# Patient Record
Sex: FEMALE | Race: WHITE | NOT HISPANIC OR LATINO | Employment: FULL TIME | URBAN - METROPOLITAN AREA
[De-identification: names, ages, dates, MRNs, and addresses within clinical notes are randomized per-mention and may not be internally consistent; named-entity substitution may affect disease eponyms.]

---

## 2018-02-06 ENCOUNTER — HOSPITAL ENCOUNTER (OUTPATIENT)
Dept: RADIOLOGY | Facility: HOSPITAL | Age: 33
Discharge: HOME | End: 2018-02-06
Attending: INTERNAL MEDICINE
Payer: MEDICAID

## 2018-02-06 DIAGNOSIS — M25.559 HIP PAIN: Primary | ICD-10-CM

## 2018-02-06 DIAGNOSIS — M25.559 HIP PAIN: ICD-10-CM

## 2018-02-06 PROCEDURE — 73502 X-RAY EXAM HIP UNI 2-3 VIEWS: CPT | Mod: TC,FY,PO,RT

## 2018-02-16 DIAGNOSIS — M25.551 RIGHT HIP PAIN: Primary | ICD-10-CM

## 2018-03-07 ENCOUNTER — CLINICAL SUPPORT (OUTPATIENT)
Dept: REHABILITATION | Facility: HOSPITAL | Age: 33
End: 2018-03-07
Payer: MEDICAID

## 2018-03-07 DIAGNOSIS — M53.3 SI (SACROILIAC) PAIN: ICD-10-CM

## 2018-03-07 DIAGNOSIS — R29.898 WEAKNESS OF RIGHT LOWER EXTREMITY: ICD-10-CM

## 2018-03-07 PROCEDURE — 97161 PT EVAL LOW COMPLEX 20 MIN: CPT | Mod: PO

## 2018-03-07 NOTE — PLAN OF CARE
TIME RECORD    Date: 3/7/2018    Start Time:  11:00  Stop Time:  12:00    PROCEDURES:    TIMED  Procedure Min.   TE 5NC                     UNTIMED  Procedure Min.   PT eval 55         Total Timed Minutes:  0  Total Timed Units:  0  Total Untimed Units:  1  Charges Billed/# of units:  1    OUTPATIENT PHYSICAL THERAPY   PATIENT EVALUATION  Onset Date: Jan 2018  Primary Diagnosis: weakness, SI pain, decreased functional mobility  Treatment Diagnosis:   1. Weakness of right lower extremity     2. SI (sacroiliac) pain       No past medical history on file.  Precautions: Standard  Prior Therapy: none  Medications: Shelby AMIN currently has no medications in their medication list.  Nutrition:  Normal  History of Present Illness: Insidious onset of pain in Jan 2018  Prior Level of Function: Independent  Social History: Lives with family, 3 children (14, 13 , 4)  Place of Residence (Steps/Adaptations): single story home, four flights of stairs at work  Functional Deficits Leading to Referral/Nature of Injury: Difficulty performing her usual work outs, transfers from low surfaces, vehicle transfers, prolonged walking, prolonged sitting, ascending/descending steps, holding her 4 year old, dressing  Patient Therapy Goals: get rid of the pain, regain range of motion, and be able to be active again    Subjective     Shelby AMIN states her pain started insidiously in Jan 2018. Normally she does a high intensity interval workout of boxing training with occasional running but has not been able to since her pain started. She is also having difficulty picking up and holding her 4 year old child, lifting her R leg to put on her socks and shoes, transfers from low surfaces, performing vehicle transfers into her SUV, walking for long periods of time, ascending/descending the stairs at work, and sitting for long periods of time. 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     Pain:  Location: R hip and posterior thigh   Description: Sharp and soreness  Activities Which Increase Pain: Sitting, Walking, Lifting and Getting out of bed/chair  Activities Which Decrease Pain: pain medication and rest  Pain Scale: 0/10 at best 7/10 now  9/10 at worst    Objective     Posture: sacral sitting, decreased WB on R LE in standing, L LE in ER in standing, R LE shorter than L LE in supine, R ASIS lower than L ASIS in supine  Palpation: TTP R ASIS, R piriformis, R SI joint  Sensation: light touch intact  DTRs:intact  Range of Motion/Strength:  pain with PROM R add    Hip Right  Left  Pain/Dysfunction with Movement    AROM MMT AROM MMT    Flexion 120 5 120 5 Pain R post hip w/ ROM   Extension NT 5 NT 5    Abduction 50 3 50 5    Adduction NT  NT     Internal rotation 40 4 40 3 Pain post R hip with MMT   External rotation 45 3 45 4 Pain post hip R w/ ROM      Knee Right  Left  Pain/Dysfunction with Movement    AROM MMT AROM MMT    Flexion WFL 4/5 WFL 5/5 Pain R post hip   Extension WFL 5/5 WFL 5/5      Ankle Right  Left  Pain/Dysfunction with Movement    AROM MMT AROM MMT    Plantarflexion WFL 5/5 WFL 5/5    Dorsiflexion WFL 5/5 WFL 5/5          Flexibility: SLR 75* L, R 65* pain in R gluteal region  Gait: Without AD  Analysis: Assistance none, mild antalgic gait, L LE in ER compared to R LE  Bed Mobility:Independent  Transfers: Independent  Special Tests: SY positive R, Gaenslen positive R, pelvic compression positive R, R hip scour is negative, long axis distraction on R LE no change in symptoms  Other: Lumbar PIVM WNL    Functional Limitation Reports: G codes  Tool: FOTO Hip SURVEY  Category: Mobility ()  Limitation: 40%  Current: CK = at least 40% but < 60% impaired, limited or restricted  Goal: CI = at least 1% but < 20% impaired, limited or restricted    TREATMENT     Time In:  11:55  Time Out: 12:00    PT Evaluation Completed? Yes  Discussed Plan of Care with patient: Yes    Shelby received 5 minutes of therapeutic exercise & instruction including:  Push/pull in sitting and supine  Shelby received 0 minutes of manual therapy including:      Written Home Exercises Provided: Handouts provided for push/pull muscle energy technique in supine  Shelby demo good understanding of the education provided. Patient demo good return demo of skill of exercises.      Assessment       Initial Assessment (Pertinent finding, problem list and factors affecting outcome): Mrs. Anderson is a 33 year old female, who presents to the clinic with complaints of acute R posterior hip pain. She displays R hip AROM WNL but with complaints at end range ER and flexion, which limits her ability to dress, ascend/descend stairs, and perform transfers without an increase in symptoms. Her R LE weakness also limits her ability to perform transfers from low surfaces, vehicle transfers, her usual recreational activities, and care for her 4 year old without an increase in symptoms. In standing she has decreased WB on R LE and her L LE is held in slight ER. In supine her R LE is shorter than her L LE, her R ASIS is lower than her L ASIS. She has tenderness to palpation over her R SI joint, R piriformis, R gluteals, and R QL. She tested positive for SI involvement with SY, Gaenslen, and pelvic compression on R side. Her current score on FOTO Hip places her in the 40%<60% impaired, limited, or restricted category. She would benefit from physical therapy to improve her strength, flexibility, and ROM in order to return to her PLOF.     History  Co-morbidities and personal factors that may impact the plan of care Examination  Body Structures and Functions, activity limitations and participation restrictions that may impact the plan of care    Clinical Presentation   Co-morbidities:   none        Personal Factors:   no deficits Body  Regions:   lower extremities    Body Systems:    ROM  strength  flexibility            Participation Restrictions:   Difficulty with transfers from low surfaces, vehicle transfers, ascending/descending steps, holding her 4 yr old child, performing preferred work outs, prolonged walking and prolonged sitting     Activity limitations:   Learning and applying knowledge  no deficits    General Tasks and Commands  no deficits    Communication  no deficits    Mobility  lifting and carrying objects  walking  driving (bike, car, motorcycle)    Self care  dressing    Domestic Life  assisting others    Interactions/Relationships  no deficits    Life Areas  no deficits    Community and Social Life  recreation and leisure         evolving clinical presentation with changing clinical characteristics                      low   low  low Decision Making/ Complexity Score:  FOTO 40%       Rehab Potiential: good  Barriers to Rehab: None  Short Term Goals (4 Weeks):   1. This patient will be independent with a basic HEP.  2. This patient will have R hip AROM pain free and WNL in order to dress with no increase in symptoms.  3. This patient will increase R LE strength by 1/2 grade in order to hold her 4 year old with no increase in symptoms.   4. This patient will have a pain rating of 5/10 at worst with ADLs.  5. Patient will be able to achieve greater than or equal to 75 on the FOTO Hip placing patient in 20%<40% impaired, limited, or restricted category demonstrating overall improved functional ability with lower extremity.   Long Term Goals (8 Weeks):   1. This patient will be independent with an updated HEP.  2. This patient will increase R LE strength to 5/5 in order to ascend/descend 4 flights of stairs with no increase in symptoms.   4. This patient will have a pain rating of 1/10 at worst with ADLs.  5. Patient will be able to achieve greater than or equal to 85 on the FOTO Hip placing patient in 1%<20% impaired, limited, or  restricted category demonstrating overall improved functional ability with lower extremity.     Plan     Certification Period: 03/07/18 to 05/07/18  Recommended Treatment Plan: 2 times per week for 8 weeks: Group Therapy, Manual Therapy, Moist Heat/ Ice, Neuromuscular Re-ed, Patient Education and Therapeutic Exercise  Other Recommendations: modalities prn, IASTM prn, kinesiotape prn, Functional Dry Needling prn       Therapist: Kitty Guy, PT    I CERTIFY THE NEED FOR THESE SERVICES FURNISHED UNDER THIS PLAN OF TREATMENT AND WHILE UNDER MY CARE    Physician's comments: ________________________________________________________________________________________________________________________________________________      Physician's Name: ___________________________________

## 2018-03-08 PROBLEM — M53.3 SI (SACROILIAC) PAIN: Status: ACTIVE | Noted: 2018-03-08

## 2018-03-08 PROBLEM — R29.898 WEAKNESS OF RIGHT LOWER EXTREMITY: Status: ACTIVE | Noted: 2018-03-08

## 2018-03-16 ENCOUNTER — CLINICAL SUPPORT (OUTPATIENT)
Dept: REHABILITATION | Facility: HOSPITAL | Age: 33
End: 2018-03-16
Payer: MEDICAID

## 2018-03-16 DIAGNOSIS — R29.898 WEAKNESS OF RIGHT LOWER EXTREMITY: ICD-10-CM

## 2018-03-16 DIAGNOSIS — M53.3 SI (SACROILIAC) PAIN: ICD-10-CM

## 2018-03-16 PROCEDURE — 97110 THERAPEUTIC EXERCISES: CPT | Mod: PO

## 2018-03-16 NOTE — PATIENT INSTRUCTIONS
Supine: Leg Stretch With Strap (Intermediate)        Lie on back with one knee bent, foot flat on floor. Hook strap around other foot. Straighten knee. Raise leg further toward its maximal range. Hold 10 seconds. Relax leg completely down to floor.   Repeat 10 times per session. Do 2 sessions per day.    Piriformis Stretch, Supine        Lie supine, legs bent, feet flat. Raise one bent leg and, grasping ankle with both hands, pull leg toward opposite shoulder. Hold 10 seconds.   Repeat 10 times per session. Do 2 sessions per day. Perform with other leg straight.    Copyright © [employer]. All rights reserved.   Isometric Stabilization        Tighten abdominal muscles as if tightening a belt. Hold 5 seconds.  Do 10 times, 2 times per day.     [email or web address]     Copyright © [employer]. All rights reserved.   Knee to Chest: Transverse Plane Stability        Bring one knee up, then return. Be sure pelvis does not roll side to side. Keep pelvis still. Lift knee 10 times. Restabilize pelvis. Repeat with other leg.  Do 3 sets, 2 times per day.     [email or web address]     Copyright © [employer]. All rights reserved.   Bridging        Slowly raise buttocks from floor, keeping stomach tight.  Repeat 10 times per set. Do 3 sets per session. Do 2 sessions per day.     [email or web address]     Copyright © [employer]. All rights reserved.   Strengthening: Straight Leg Raise (Phase 1)        Tighten muscles on front of right thigh, then lift leg 8 inches from surface, keeping knee locked.   Repeat 10 times per set. Do 3 sets per session. Do 2 sessions per day.

## 2018-03-16 NOTE — PROGRESS NOTES
"DAILY TREATMENT NOTE      Start Time:  9:14  Stop Time:  9:55    PROCEDURES:    TIMED  Procedure Min.   TE 41                     UNTIMED  Procedure Min.             Total Timed Minutes:  41  Total Timed Units:  3  Total Untimed Units:  0  Charges Billed/# of units:  3      Progress/Current Status    Subjective:     Patient ID: Shelby AMIN is a 33 y.o. female.  Diagnosis:   1. Weakness of right lower extremity     2. SI (sacroiliac) pain       Pain: 2 /10  She reports feeling better since last visit, walking is not hurting as much, but she still has pain when putting on her shoes( ER of L hip)    Objective:     Patient presented to the clinic with a level pelvis. She was educated and performed therapeutic exercises as per log, 1:1 with PT x 41 minutes to improve her flexibility, strength, and core stability. She declined a cold pack or moist heat at the end of the session.     Date 03/16/18   Visit 2/20   FTF 04/07/18   FOTO 2/5   POC exp 05/07/18   MHP declined   MT --   HS stretech 10 x 10"   Piriformis stretch 10 x 10"   Supine:    TAs 10 x 5"       March 1 x 10    Bug --   Bridge 1 x 10    SLR 1 x 10    Prone:    Alt LE --   Alt LE/UE --   Seated:        March 1 x 10   LAQs --   Lats --   Rows --             Initials DG       Assessment:     Patient was able to begin making progress towards her goals as she was able to perform all of today's activities with no increase in symptoms prior to leaving the clinic. She required occasional cues to keep her exercises in a pain free range and to maintain her core engagement with all exercises.     Patient Education/Response:     Patient was given handouts of today's activities and instructed to perform her HEP to her tolerance twice a day. She verbalized understanding instructions.     Plans and Goals:     Continue with the plan of care and progress as the patient tolerates.     Short Term Goals (4 Weeks):   1. This patient will be independent with a basic " HEP.  2. This patient will have R hip AROM pain free and WNL in order to dress with no increase in symptoms.  3. This patient will increase R LE strength by 1/2 grade in order to hold her 4 year old with no increase in symptoms.   4. This patient will have a pain rating of 5/10 at worst with ADLs.  5. Patient will be able to achieve greater than or equal to 75 on the FOTO Hip placing patient in 20%<40% impaired, limited, or restricted category demonstrating overall improved functional ability with lower extremity.   Long Term Goals (8 Weeks):   1. This patient will be independent with an updated HEP.  2. This patient will increase R LE strength to 5/5 in order to ascend/descend 4 flights of stairs with no increase in symptoms.   4. This patient will have a pain rating of 1/10 at worst with ADLs.  5. Patient will be able to achieve greater than or equal to 85 on the FOTO Hip placing patient in 1%<20% impaired, limited, or restricted category demonstrating overall improved functional ability with lower extremity.

## 2018-03-21 ENCOUNTER — CLINICAL SUPPORT (OUTPATIENT)
Dept: REHABILITATION | Facility: HOSPITAL | Age: 33
End: 2018-03-21
Payer: MEDICAID

## 2018-03-21 DIAGNOSIS — M53.3 SI (SACROILIAC) PAIN: ICD-10-CM

## 2018-03-21 DIAGNOSIS — R29.898 WEAKNESS OF RIGHT LOWER EXTREMITY: ICD-10-CM

## 2018-03-21 PROCEDURE — 97110 THERAPEUTIC EXERCISES: CPT | Mod: PO

## 2018-03-21 NOTE — PROGRESS NOTES
"DAILY TREATMENT NOTE      Start Time:  10:00  Stop Time:  11:00    PROCEDURES:    TIMED  Procedure Min.   TE 30NC   TE sup 30                 UNTIMED  Procedure Min.             Total Timed Minutes:  30  Total Timed Units:  2  Total Untimed Units:  0  Charges Billed/# of units:  2      Progress/Current Status    Subjective:     Patient ID: Shelby AMIN is a 33 y.o. female.  Diagnosis:   1. Weakness of right lower extremity     2. SI (sacroiliac) pain       Pain: 0 /10  She reports no pain today, but still having pain with putting on shoes and side ways movements    Objective:     Patient presented to the clinic with a level pelvis. She was educated and performed therapeutic exercises as per log, along with today's progressions and new exercises supervised by PT x 30 minutes and 1:1 with PT x 20 minutes to improve her flexibility, strength, and core stability. STM/MFR x 10 minutes in prone to R piriformis was performed by PT. She declined a cold pack or moist heat at the end of the session.     Date 03/21/18 03/16/18   Visit 3/20 2/20   FTF 04/07/18 04/07/18   FOTO 3/5 2/5   POC exp 05/07/18 05/07/18   MHP declined declined   MT 10' --   HS stretech 10 x 10" 10 x 10"   Piriformis stretch 10 x 10" 10 x 10"   Supine:     TAs 10 X 5" 10 x 5"        March 1 x 15 1 x 10    Bug -- --   Bridge 1 x 15 1 x 10    SLR 1 x 15 1 x 10    Hip abd  1 x 15    Prone:     Alt LE -- --   Alt LE/UE -- --   Seated:     HS curl 1 x 10 GTB     March 1 x 10 1 x 10   LAQs -- --   Lats -- --   Rows -- --   Step ups lat Next?           Initials DG  DG       Assessment:     She was able to perform all of today's activities with no increase in symptoms prior to leaving the clinic. She required occasional cues to keep her exercises in a pain free range.     Patient Education/Response:     Patient was instructed to continue to perform her HEP to her tolerance twice a day. She verbalized understanding instructions.     Plans and Goals: "     Continue with the plan of care and progress as the patient tolerates.     Short Term Goals (4 Weeks):   1. This patient will be independent with a basic HEP.  2. This patient will have R hip AROM pain free and WNL in order to dress with no increase in symptoms.  3. This patient will increase R LE strength by 1/2 grade in order to hold her 4 year old with no increase in symptoms.   4. This patient will have a pain rating of 5/10 at worst with ADLs.  5. Patient will be able to achieve greater than or equal to 75 on the FOTO Hip placing patient in 20%<40% impaired, limited, or restricted category demonstrating overall improved functional ability with lower extremity.   Long Term Goals (8 Weeks):   1. This patient will be independent with an updated HEP.  2. This patient will increase R LE strength to 5/5 in order to ascend/descend 4 flights of stairs with no increase in symptoms.   4. This patient will have a pain rating of 1/10 at worst with ADLs.  5. Patient will be able to achieve greater than or equal to 85 on the FOTO Hip placing patient in 1%<20% impaired, limited, or restricted category demonstrating overall improved functional ability with lower extremity.

## 2018-03-23 ENCOUNTER — CLINICAL SUPPORT (OUTPATIENT)
Dept: REHABILITATION | Facility: HOSPITAL | Age: 33
End: 2018-03-23
Payer: MEDICAID

## 2018-03-23 DIAGNOSIS — M53.3 SI (SACROILIAC) PAIN: ICD-10-CM

## 2018-03-23 DIAGNOSIS — R29.898 WEAKNESS OF RIGHT LOWER EXTREMITY: ICD-10-CM

## 2018-03-23 PROCEDURE — 97110 THERAPEUTIC EXERCISES: CPT | Mod: PO

## 2018-03-23 NOTE — PROGRESS NOTES
"DAILY TREATMENT NOTE      Start Time:  8:00  Stop Time:  8:40    PROCEDURES:    TIMED  Procedure Min.   TE 40                     UNTIMED  Procedure Min.             Total Timed Minutes:  40  Total Timed Units:  3  Total Untimed Units:  0  Charges Billed/# of units:  3      Progress/Current Status    Subjective:     Patient ID: Shelby AMIN is a 33 y.o. female.  Diagnosis:   1. Weakness of right lower extremity     2. SI (sacroiliac) pain       Pain: 0 /10  She reports her hip feels better, not as tight. She did do a dance routine with her daughter last night with no difficulty. She feels the massage helped loosen her hip up and it does not feel as tight.     Objective:     Patient presented to the clinic with a level pelvis. She was educated and performed therapeutic exercises as per log, along with today's progressions and new exercises 1:1 with  PT x 40 minutes to improve her flexibility, strength, and core stability. She declined a cold pack or moist heat at the end of the session. She declined STM/MFR at the end of the session.    Date 03/23/18 03/21/18 03/16/18   Visit 4/20 3/20 2/20   FTF 04/07/18 04/07/18 04/07/18   FOTO 4/5 3/5 2/5   POC exp 05/07/18 05/07/18 05/07/18   MHP declined declined declined   MT declined 10' --   HS stretech 10 x 10" 10 x 10" 10 x 10"   Piriformis stretch 10 x 10" 10 x 10" 10 x 10"   Supine:      TAs 10 x 5" 10 x 5" 10 x 5"         March -- 1 x 15 1 x 10    Bug 1 x 10 -- --   Bridge 1 x 15 1 x 15 1 x 10    SLR 2 x 10 1 x 15 1 x 10    Hip abd  1 x 10 SL 1 x 15    Prone:      Alt LE -- -- --   Alt LE/UE 1 x 10 -- --   Seated:      HS curl 2 x 10 GTB 1 x 10 GTB     March 1 x 15 1 x 10 1 x 10   LAQs -- -- --   Lats -- -- --   Rows -- -- --   Step ups lat L1 1 x 10  Next?            Initials DG DG  DG       Assessment:     Patient attempted single leg bridge but had increased pain on R side after 5 reps. She was able to perform all other progressions and new exercises with no " increase in symptoms prior to leaving the clinic. She required occasional cues to avoid substitutions with prone and side lying exercises.       Patient Education/Response:     Patient was instructed to continue to perform her HEP to her tolerance twice a day. She verbalized understanding instructions.     Plans and Goals:     Continue with the plan of care and progress as the patient tolerates.     Short Term Goals (4 Weeks):   1. This patient will be independent with a basic HEP.  2. This patient will have R hip AROM pain free and WNL in order to dress with no increase in symptoms.  3. This patient will increase R LE strength by 1/2 grade in order to hold her 4 year old with no increase in symptoms.   4. This patient will have a pain rating of 5/10 at worst with ADLs.  5. Patient will be able to achieve greater than or equal to 75 on the FOTO Hip placing patient in 20%<40% impaired, limited, or restricted category demonstrating overall improved functional ability with lower extremity.   Long Term Goals (8 Weeks):   1. This patient will be independent with an updated HEP.  2. This patient will increase R LE strength to 5/5 in order to ascend/descend 4 flights of stairs with no increase in symptoms.   4. This patient will have a pain rating of 1/10 at worst with ADLs.  5. Patient will be able to achieve greater than or equal to 85 on the FOTO Hip placing patient in 1%<20% impaired, limited, or restricted category demonstrating overall improved functional ability with lower extremity.

## 2018-03-26 ENCOUNTER — CLINICAL SUPPORT (OUTPATIENT)
Dept: REHABILITATION | Facility: HOSPITAL | Age: 33
End: 2018-03-26
Payer: MEDICAID

## 2018-03-26 DIAGNOSIS — M53.3 SI (SACROILIAC) PAIN: ICD-10-CM

## 2018-03-26 DIAGNOSIS — R29.898 WEAKNESS OF RIGHT LOWER EXTREMITY: ICD-10-CM

## 2018-03-26 PROCEDURE — 97110 THERAPEUTIC EXERCISES: CPT | Mod: PO

## 2018-03-26 NOTE — PROGRESS NOTES
"DAILY TREATMENT NOTE      Start Time:  1:00  Stop Time:  1:53    PROCEDURES:    TIMED  Procedure Min.   TE 30   TE sup 15NC                 UNTIMED  Procedure Min.   CP 8NC         Total Timed Minutes:  30  Total Timed Units:  2  Total Untimed Units:  0  Charges Billed/# of units:  2      Progress/Current Status    Subjective:     Patient ID: Shelby AMIN is a 33 y.o. female.  Diagnosis:   1. Weakness of right lower extremity     2. SI (sacroiliac) pain       Pain: 6 /10  Patient reports she has been painful and stiff since Friday. It started Friday when she got home.     Objective:     Patient presented to the clinic with a level pelvis. She was educated and performed therapeutic exercises as per log, 1:1 with  PT x 30 minutes and supervised by PT x 15 minutes to improve her flexibility, strength, and core stability. She received a cold pack in prone to her R SI area x 8 minutes at the end of the session.     Date 03/26/18 03/23/18 03/21/18 03/16/18   Visit 5/20 4/20 3/20 2/20   FTF 04/07/18 04/07/18 04/07/18 04/07/18   FOTO 5/5 4/5 3/5 2/5   POC exp 05/07/18 05/07/18 05/07/18 05/07/18   MHP declined declined declined declined   MT declined declined 10' --   HS stretech 10 x 10" 10 x 10" 10 x 10" 10 x 10"   Piriformis stretch 10 x 10" 10 x 10" 10 x 10" 10 x 10"   Supine:       TAs 10 x 5" 10 x 5" 10 x 5" 10 x 5"          March 1 x 15 -- 1 x 15 1 x 10    Bug held 1 x 10 -- --   Bridge 1 x 15 1 x 15 1 x 15 1 x 10    SLR 2 x 10 2 x 10 1 x 15 1 x 10    Hip abd  held 1 x 10 SL 1 x 15    Prone:       Alt LE -- -- -- --   Alt LE/UE 1 x 10 1 x 10 -- --   Seated:       HS curl held 2 x 10 GTB 1 x 10 GTB     March held 1 x 15 1 x 10 1 x 10   LAQs 1 x 10 -- -- --   Lats -- -- -- --   Rows -- -- -- --   Step ups lat Held L1 1 x 10  Next?             Initials DG DG DG  DG     FOTO Hip 62, 20%<40%  Assessment:     Patient was unable to tolerate any hip abduction exercises today, due to increased pain with all attempts. " She was able to perform all of her exercises with occasional cues to keep her exercises in a pain free range of motion. She was able to perform all of today's exercises with no increase in symptoms prior to leaving the clinic. Her current score on FOTO Hip places her in the 20%<40% impaired, limited, or restricted category.    Patient Education/Response:     Patient was instructed to continue to perform her HEP to her tolerance twice a day. She verbalized understanding instructions.     Plans and Goals:     Continue with the plan of care and progress as the patient tolerates.     Short Term Goals (4 Weeks):   1. This patient will be independent with a basic HEP.  2. This patient will have R hip AROM pain free and WNL in order to dress with no increase in symptoms.  3. This patient will increase R LE strength by 1/2 grade in order to hold her 4 year old with no increase in symptoms.   4. This patient will have a pain rating of 5/10 at worst with ADLs.  5. Patient will be able to achieve greater than or equal to 75 on the FOTO Hip placing patient in 20%<40% impaired, limited, or restricted category demonstrating overall improved functional ability with lower extremity.   Long Term Goals (8 Weeks):   1. This patient will be independent with an updated HEP.  2. This patient will increase R LE strength to 5/5 in order to ascend/descend 4 flights of stairs with no increase in symptoms.   4. This patient will have a pain rating of 1/10 at worst with ADLs.  5. Patient will be able to achieve greater than or equal to 85 on the FOTO Hip placing patient in 1%<20% impaired, limited, or restricted category demonstrating overall improved functional ability with lower extremity.

## 2018-04-06 ENCOUNTER — DOCUMENTATION ONLY (OUTPATIENT)
Dept: REHABILITATION | Facility: HOSPITAL | Age: 33
End: 2018-04-06

## 2018-04-06 ENCOUNTER — CLINICAL SUPPORT (OUTPATIENT)
Dept: REHABILITATION | Facility: HOSPITAL | Age: 33
End: 2018-04-06
Payer: MEDICAID

## 2018-04-06 DIAGNOSIS — M53.3 SI (SACROILIAC) PAIN: ICD-10-CM

## 2018-04-06 DIAGNOSIS — R29.898 WEAKNESS OF RIGHT LOWER EXTREMITY: ICD-10-CM

## 2018-04-06 PROCEDURE — 97110 THERAPEUTIC EXERCISES: CPT | Mod: PO

## 2018-04-06 NOTE — PATIENT INSTRUCTIONS
Side lying positional distraction    1) Begin with either pillows or towel/blanket for support of the head/neck  2) Take another pillow or towel/blanket and place it under the desired place in between the surface and your body.   3) Use the top arm and top leg as assistance to stretch/open the top side    The side that is unaffected will be the side down on the table.

## 2018-04-06 NOTE — PROGRESS NOTES
Face to Face PTA Conference performed with Tu Escoto, PTA regarding patient's current status, overall progress, and plan of care    Face to Face PTA Conference performed with Kitty Guy, PT regarding patient's current status, overall progress, and plan of care    Tu Escoto  4/9/2018

## 2018-04-06 NOTE — PROGRESS NOTES
"DAILY TREATMENT NOTE      Start Time:  10:00  Stop Time:  11:00    PROCEDURES:    TIMED  Procedure Min.   TE 30   TE sup 30NC                 UNTIMED  Procedure Min.             Total Timed Minutes:  30  Total Timed Units:  2  Total Untimed Units:  0  Charges Billed/# of units:  2      Progress/Current Status    Subjective:     Patient ID: Shelby AMIN is a 33 y.o. female.  Diagnosis:   1. Weakness of right lower extremity     2. SI (sacroiliac) pain       Pain: 7 /10  She reports she has been having shooting pain down the back of her R leg and her R foot would feel numb and tingling. She is getting it more with standing and walking. She did do a lot of spring cleaning since her last visit.     Objective:     Patient presented to the clinic with a level pelvis. She was educated and performed therapeutic exercises as per log, supervised by PT x 30 minutes and 1:1 with  PT x 20 minutes to improve her flexibility, strength, and core stability. STM/MFR was performed in prone and supine to R piriformis, R QL, R HS and R IT band x 10 minutes by PT. She declined a cold pack or moist heat at the end of the session.      Date 04/06/18 03/26/18 03/23/18 03/21/18 03/16/18   Visit 6/20 5/20 4/20 3/20 2/20   FTF 05/06/18 04/07/18 04/07/18 04/07/18 04/07/18   FOTO -- 5/5 4/5 3/5 2/5   POC exp 05/07/18 05/07/18 05/07/18 05/07/18 05/07/18   MHP declined declined declined declined declined   MT 10' declined declined 10' --   HS stretech 10 x 10" 10 x 10" 10 x 10" 10 x 10" 10 x 10"   Piriformis stretch 10 x 10" 10 x 10" 10 x 10" 10 x 10" 10 x 10"   Supine:        TAs 10 x 5" 10 x 5" 10 x 5" 10 x 5" 10 x 5"           March 1 x 15 1 x 15 -- 1 x 15 1 x 10    Bug held held 1 x 10 -- --   Bridge 1 x 15 1 x 15 1 x 15 1 x 15 1 x 10    SLR 2 x 10 2 x 10 2 x 10 1 x 15 1 x 10    Hip abd  1 x 10 held 1 x 10 SL 1 x 15    Prone:        Alt LE 1 x 10 -- -- -- --   Alt LE/UE held 1 x 10 1 x 10 -- --   Seated:        HS curl held held 2 x 10 " GTB 1 x 10 GTB     March Held held 1 x 15 1 x 10 1 x 10   LAQs 1 x 10 1 x 10 -- -- --   Lats -- -- -- -- --   Rows -- -- -- -- --   Step ups lat held Held L1 1 x 10  Next?              Initials DG DG DG DG  DG     Assessment:     Patient was able to resume most of her usual exercises today with no increase in symptoms prior to leaving the clinic. She required occasional cues to keep her exercises in a pain free range. She would benefit from continued physical therapy to improve her strength, flexibility, and core stability.     Patient Education/Response:     Patient was instructed to continue to perform her HEP to her tolerance twice a day. She verbalized understanding instructions.     Plans and Goals:     Continue with the plan of care and progress as the patient tolerates.     Short Term Goals (4 Weeks):   1. This patient will be independent with a basic HEP.  2. This patient will have R hip AROM pain free and WNL in order to dress with no increase in symptoms.  3. This patient will increase R LE strength by 1/2 grade in order to hold her 4 year old with no increase in symptoms.   4. This patient will have a pain rating of 5/10 at worst with ADLs.  5. Patient will be able to achieve greater than or equal to 75 on the FOTO Hip placing patient in 20%<40% impaired, limited, or restricted category demonstrating overall improved functional ability with lower extremity.   Long Term Goals (8 Weeks):   1. This patient will be independent with an updated HEP.  2. This patient will increase R LE strength to 5/5 in order to ascend/descend 4 flights of stairs with no increase in symptoms.   4. This patient will have a pain rating of 1/10 at worst with ADLs.  5. Patient will be able to achieve greater than or equal to 85 on the FOTO Hip placing patient in 1%<20% impaired, limited, or restricted category demonstrating overall improved functional ability with lower extremity.

## 2018-04-11 ENCOUNTER — CLINICAL SUPPORT (OUTPATIENT)
Dept: REHABILITATION | Facility: HOSPITAL | Age: 33
End: 2018-04-11
Payer: MEDICAID

## 2018-04-11 DIAGNOSIS — M53.3 SI (SACROILIAC) PAIN: ICD-10-CM

## 2018-04-11 DIAGNOSIS — R29.898 WEAKNESS OF RIGHT LOWER EXTREMITY: ICD-10-CM

## 2018-04-11 PROCEDURE — 97110 THERAPEUTIC EXERCISES: CPT | Mod: PO

## 2018-04-11 NOTE — PROGRESS NOTES
DAILY TREATMENT NOTE      Start Time:  11:05  Stop Time:  11:45    PROCEDURES:    TIMED  Procedure Min.   TE 30   MT 10                 UNTIMED  Procedure Min.             Total Timed Minutes:  40  Total Timed Units:  3  Total Untimed Units:  0  Charges Billed/# of units:  3      Progress/Current Status    Subjective:     Patient ID: Shelby AMIN is a 33 y.o. female.  Diagnosis:   1. Weakness of right lower extremity     2. SI (sacroiliac) pain       Pain: 8 /10  She reports it feels like her leg is getting worse. Earlier today she was laying on her R side and she started getting tingling in her R foot, but it eased when she rolled on to her left side. She has also noticed that it was getting harder to put on her R shoe again.      Objective:     Patient presented to the clinic with a level pelvis. She was educated and performed therapeutic exercises as per log, 1:1 with  PT x 30 minutes to improve her flexibility, strength, and core stability. STM/MFR was performed in prone x 10 minutes by PT. She declined a cold pack or moist heat at the end of the session.      Dry needling with trigger point/manual therapy techniques was performed in prone. Dry needling performed to R superior cluneal homeostatic neuro-trigger point, R posterior cutaneous of L5 homeostatic neuro-trigger point, R inferior gluteal homeostatic neuro-trigger point, and R lateral popliteal homeostatic neuro-trigger point. All needles were removed and changes in signs and symptoms were noted, no adverse events at this time.  Dry needling was performed to decrease inflammation, increase circulation, decrease pain and restore homeostasis.  Dry needling consent form was reviewed with the patient addressing all questions and concerns and signed by patient.  Copy of the consent form was provided to patient and copy of consent form scanned to patient Epic chart.    Date 04/11/18 04/06/18 03/26/18 03/23/18 03/21/18 03/16/18   Visit 7/20 6/20 5/20  "4/20 3/20 2/20   FTF 05/06/18 05/06/18 04/07/18 04/07/18 04/07/18 04/07/18   FOTO -- -- 5/5 4/5 3/5 2/5   POC exp 05/07/18 05/07/18 05/07/18 05/07/18 05/07/18 05/07/18   MHP declined declined declined declined declined declined   MT 10' 10' declined declined 10' --   HS stretech 10 x 10" 10 x 10" 10 x 10" 10 x 10" 10 x 10" 10 x 10"   Piriformis stretch 10 x 10" 10 x 10" 10 x 10" 10 x 10" 10 x 10" 10 x 10"   Supine:         TAs 10 x 5" 10 x 5" 10 x 5" 10 x 5" 10 x 5" 10 x 5"            March held 1 x 15 1 x 15 -- 1 x 15 1 x 10    Bug held held held 1 x 10 -- --   Bridge 1 x 15 1 x 15 1 x 15 1 x 15 1 x 15 1 x 10    SLR 2 x 10 2 x 10 2 x 10 2 x 10 1 x 15 1 x 10    Hip abd  1 x 10 1 x 10 held 1 x 10 SL 1 x 15    Prone:         Alt LE 1 x 10 1 x 10 -- -- -- --   Alt LE/UE held held 1 x 10 1 x 10 -- --   Seated:         HS curl held held held 2 x 10 GTB 1 x 10 GTB     March held Held held 1 x 15 1 x 10 1 x 10   LAQs held 1 x 10 1 x 10 -- -- --   Lats -- -- -- -- -- --   Rows -- -- -- -- -- --   Step ups lat held held Held L1 1 x 10  Next?               Initials DG DG DG DG DG  DG     Assessment:     Patient required one cue to keep her exercises in a pain free range today, supine marching was held due to complaints of discomfort and soreness. She was able to perform all of her other exercises with no increase in symptoms after manual therapy techniques. She was also able to perform bed mobility with no complaints after manual therapy technique. No adverse events noted after dry needling was performed. She would benefit from continued physical therapy to improve her strength, flexibility, and core stability.     Patient Education/Response:     Patient was instructed to continue to perform her HEP to her tolerance twice a day. She verbalized understanding instructions.     Plans and Goals:     Continue with the plan of care and progress as the patient tolerates.     Short Term Goals (4 Weeks):   1. This patient will be " independent with a basic HEP.  2. This patient will have R hip AROM pain free and WNL in order to dress with no increase in symptoms.  3. This patient will increase R LE strength by 1/2 grade in order to hold her 4 year old with no increase in symptoms.   4. This patient will have a pain rating of 5/10 at worst with ADLs.  5. Patient will be able to achieve greater than or equal to 75 on the FOTO Hip placing patient in 20%<40% impaired, limited, or restricted category demonstrating overall improved functional ability with lower extremity.   Long Term Goals (8 Weeks):   1. This patient will be independent with an updated HEP.  2. This patient will increase R LE strength to 5/5 in order to ascend/descend 4 flights of stairs with no increase in symptoms.   4. This patient will have a pain rating of 1/10 at worst with ADLs.  5. Patient will be able to achieve greater than or equal to 85 on the FOTO Hip placing patient in 1%<20% impaired, limited, or restricted category demonstrating overall improved functional ability with lower extremity.

## 2018-04-13 ENCOUNTER — CLINICAL SUPPORT (OUTPATIENT)
Dept: REHABILITATION | Facility: HOSPITAL | Age: 33
End: 2018-04-13
Payer: MEDICAID

## 2018-04-13 DIAGNOSIS — R29.898 WEAKNESS OF RIGHT LOWER EXTREMITY: ICD-10-CM

## 2018-04-13 DIAGNOSIS — M53.3 SI (SACROILIAC) PAIN: ICD-10-CM

## 2018-04-13 PROCEDURE — 97110 THERAPEUTIC EXERCISES: CPT | Mod: PO

## 2018-04-13 NOTE — PROGRESS NOTES
DAILY TREATMENT NOTE      Start Time:  11:00  Stop Time:  11:45    PROCEDURES:    TIMED  Procedure Min.   TE 30   MT 15                 UNTIMED  Procedure Min.             Total Timed Minutes:  45  Total Timed Units:  3  Total Untimed Units:  0  Charges Billed/# of units:  3      Progress/Current Status    Subjective:     Patient ID: Shelby AMIN is a 33 y.o. female.  Diagnosis:   1. Weakness of right lower extremity     2. SI (sacroiliac) pain       Pain: 8 /10  Patient reports feeling a little better and it seems the needling helped with the pain the next day, but it was back to sharp shooting in R leg the next day.    Objective:     Patient presented to the clinic with a level pelvis. She was educated and performed therapeutic exercises as per log, 1:1 with  PT x 30 minutes to improve her flexibility, strength, and core stability. STM/MFR was performed in prone x 15 minutes by PT. She declined a cold pack or moist heat at the end of the session.      Dry needling with trigger point/manual therapy techniques was performed in prone. Dry needling performed to R superior cluneal homeostatic neuro-trigger point, R posterior cutaneous of L5 homeostatic neuro-trigger point, R inferior gluteal homeostatic neuro-trigger point, and B lumbar paraspinals at L3 and L4. All needles were removed and changes in signs and symptoms were noted, no adverse events at this time. Dry needling was performed to decrease inflammation, increase circulation, decrease pain and restore homeostasis. Dry needling consent form was reviewed with the patient addressing all questions and concerns and signed by patient.  Copy of the consent form was provided to patient and copy of consent form scanned to patient Epic chart.    Date 04/13/18 04/11/18 04/06/18 03/26/18 03/23/18 03/21/18 03/16/18   Visit 8/20 7/20 6/20 5/20 4/20 3/20 2/20   FTF 05/06/18 05/06/18 05/06/18 04/07/18 04/07/18 04/07/18 04/07/18   FOTO -- -- -- 5/5 4/5 3/5 2/5   POC  "exp 05/07/18 05/07/18 05/07/18 05/07/18 05/07/18 05/07/18 05/07/18   MHP declined declined declined declined declined declined declined   MT 10' 10' 10' declined declined 10' --   HS stretech 10 x 10" 10 x 10" 10 x 10" 10 x 10" 10 x 10" 10 x 10" 10 x 10"   Piriformis stretch 10 x 10" 10 x 10" 10 x 10" 10 x 10" 10 x 10" 10 x 10" 10 x 10"   Supine:          TAs 10 x 5" 10 x 5" 10 x 5" 10 x 5" 10 x 5" 10 x 5" 10 x 5"             March 1 x 15 held 1 x 15 1 x 15 -- 1 x 15 1 x 10    Bug -- held held held 1 x 10 -- --   Bridge 1 x 20 1 x 15 1 x 15 1 x 15 1 x 15 1 x 15 1 x 10    SLR -- 2 x 10 2 x 10 2 x 10 2 x 10 1 x 15 1 x 10    Hip abd  1 x 15 1 x 10 1 x 10 held 1 x 10 SL 1 x 15    Prone:          Alt LE 1 x 10 1 x 10 1 x 10 -- -- -- --   Alt LE/UE held held held 1 x 10 1 x 10 -- --   Seated:          HS curl held held held held 2 x 10 GTB 1 x 10 GTB     March Held - pain held Held held 1 x 15 1 x 10 1 x 10   LAQs 1 x 15 held 1 x 10 1 x 10 -- -- --   I's 1 x 10 GTB -- -- -- -- -- --   Rows 1 x 10 GTB -- -- -- -- -- --   Step ups lat held held held Held L1 1 x 10  Next?                Initials DG DG DG DG DG DG  DG     Assessment:     Patient was able to perform today's new exercises and progressions with no increase in symptoms prior to leaving the clinic. Seated marching was held today due to complaints of pain in her R hip lateral thigh. No adverse events noted with dry needling and patient reported feeling better afterwards. She required one cue to maintain her core engagement with seated exercises.     Patient Education/Response:     Patient was instructed to continue to perform her HEP to her tolerance twice a day. She verbalized understanding instructions.     Plans and Goals:     Continue with the plan of care and progress as the patient tolerates.     Short Term Goals (4 Weeks):   1. This patient will be independent with a basic HEP.  2. This patient will have R hip AROM pain free and WNL in order to dress with no " increase in symptoms.  3. This patient will increase R LE strength by 1/2 grade in order to hold her 4 year old with no increase in symptoms.   4. This patient will have a pain rating of 5/10 at worst with ADLs.  5. Patient will be able to achieve greater than or equal to 75 on the FOTO Hip placing patient in 20%<40% impaired, limited, or restricted category demonstrating overall improved functional ability with lower extremity.   Long Term Goals (8 Weeks):   1. This patient will be independent with an updated HEP.  2. This patient will increase R LE strength to 5/5 in order to ascend/descend 4 flights of stairs with no increase in symptoms.   4. This patient will have a pain rating of 1/10 at worst with ADLs.  5. Patient will be able to achieve greater than or equal to 85 on the FOTO Hip placing patient in 1%<20% impaired, limited, or restricted category demonstrating overall improved functional ability with lower extremity.

## 2018-04-18 ENCOUNTER — CLINICAL SUPPORT (OUTPATIENT)
Dept: REHABILITATION | Facility: HOSPITAL | Age: 33
End: 2018-04-18
Payer: MEDICAID

## 2018-04-18 DIAGNOSIS — R29.898 WEAKNESS OF RIGHT LOWER EXTREMITY: ICD-10-CM

## 2018-04-18 DIAGNOSIS — M53.3 SI (SACROILIAC) PAIN: ICD-10-CM

## 2018-04-18 PROCEDURE — 97110 THERAPEUTIC EXERCISES: CPT | Mod: PO

## 2018-04-18 NOTE — PROGRESS NOTES
DAILY TREATMENT NOTE      Start Time:  11:15  Stop Time:  11:55    PROCEDURES:    TIMED  Procedure Min.   TE 25   MT 15                 UNTIMED  Procedure Min.             Total Timed Minutes:  40  Total Timed Units:  3  Total Untimed Units:  0  Charges Billed/# of units:  3      Progress/Current Status    Subjective:     Patient ID: Shelby AMIN is a 33 y.o. female.  Diagnosis:   1. Weakness of right lower extremity     2. SI (sacroiliac) pain       Pain: 5 /10  Patient reports feeling stiff this morning, especially after sitting for a long period of time.     Objective:     Patient arrived to the clinic 15 minutes late for her scheduled appointment. Patient presented to the clinic with a level pelvis. She was educated and performed therapeutic exercises as per log, 1:1 with  PT x 25 minutes to improve her flexibility, strength, and core stability. STM/MFR was performed in prone x 15 minutes by PT. She declined a cold pack or moist heat at the end of the session.      Dry needling with trigger point/manual therapy techniques was performed in prone. Dry needling performed to R superior cluneal homeostatic neuro-trigger point, R posterior cutaneous of L5 homeostatic neuro-trigger point, R inferior gluteal homeostatic neuro-trigger point, and R IT band. All needles were removed and changes in signs and symptoms were noted, no adverse events at this time. Dry needling was performed to decrease inflammation, increase circulation, decrease pain and restore homeostasis. Dry needling consent form was reviewed with the patient addressing all questions and concerns and signed by patient.  Copy of the consent form was provided to patient and copy of consent form scanned to patient Epic chart.    Date 04/18/18 04/13/18 04/11/18 04/06/18 03/26/18 03/23/18 03/21/18 03/16/18   Visit 9/20 8/20 7/20 6/20 5/20 4/20 3/20 2/20   FTF 05/06/18 05/06/18 05/06/18 05/06/18 04/07/18 04/07/18 04/07/18 04/07/18   FOTO -- -- -- -- 5/5  "4/5 3/5 2/5   POC exp 05/07/18 05/07/18 05/07/18 05/07/18 05/07/18 05/07/18 05/07/18 05/07/18   MHP declined declined declined declined declined declined declined declined   MT  10' 10' 10' declined declined 10' --   HS stretech 10 x 10" 10 x 10" 10 x 10" 10 x 10" 10 x 10" 10 x 10" 10 x 10" 10 x 10"   Piriformis stretch 10 x 10" 10 x 10" 10 x 10" 10 x 10" 10 x 10" 10 x 10" 10 x 10" 10 x 10"   Supine:           TAs 10 x 5" 10 x 5" 10 x 5" 10 x 5" 10 x 5" 10 x 5" 10 x 5" 10 x 5"              March -- 1 x 15 held 1 x 15 1 x 15 -- 1 x 15 1 x 10    Bug 1 x 10 -- held held held 1 x 10 -- --   Bridge 1 x 20 1 x 20 1 x 15 1 x 15 1 x 15 1 x 15 1 x 15 1 x 10    SLR 2 x 10 -- 2 x 10 2 x 10 2 x 10 2 x 10 1 x 15 1 x 10    Hip abd  1 x 10 1 x 15 1 x 10 1 x 10 held 1 x 10 SL 1 x 15    Prone:           Alt LE oot 1 x 10 1 x 10 1 x 10 -- -- -- --   Alt LE/UE oot held held held 1 x 10 1 x 10 -- --   Seated:           HS curl oot held held held held 2 x 10 GTB 1 x 10 GTB     March oot Held - pain held Held held 1 x 15 1 x 10 1 x 10   LAQs oot 1 x 15 held 1 x 10 1 x 10 -- -- --   I's oot 1 x 10 GTB -- -- -- -- -- --   Rows oot 1 x 10 GTB -- -- -- -- -- --   Step ups lat oot held held held Held L1 1 x 10  Next?                 Initials DG DG DG DG DG DG DG  DG     Assessment:     Patient was able to perform today's progressions with no increase in symptoms prior to leaving the clinic. She required cues to keep her exercises in a pain free range. She had no adverse events with dry needling.     Patient Education/Response:     Patient was instructed to continue to perform her HEP to her tolerance twice a day. She verbalized understanding instructions.     Plans and Goals:     Continue with the plan of care and progress as the patient tolerates.     Short Term Goals (4 Weeks):   1. This patient will be independent with a basic HEP.  2. This patient will have R hip AROM pain free and WNL in order to dress with no increase in symptoms.  3. " This patient will increase R LE strength by 1/2 grade in order to hold her 4 year old with no increase in symptoms.   4. This patient will have a pain rating of 5/10 at worst with ADLs.  5. Patient will be able to achieve greater than or equal to 75 on the FOTO Hip placing patient in 20%<40% impaired, limited, or restricted category demonstrating overall improved functional ability with lower extremity.   Long Term Goals (8 Weeks):   1. This patient will be independent with an updated HEP.  2. This patient will increase R LE strength to 5/5 in order to ascend/descend 4 flights of stairs with no increase in symptoms.   4. This patient will have a pain rating of 1/10 at worst with ADLs.  5. Patient will be able to achieve greater than or equal to 85 on the FOTO Hip placing patient in 1%<20% impaired, limited, or restricted category demonstrating overall improved functional ability with lower extremity.

## 2018-04-20 ENCOUNTER — CLINICAL SUPPORT (OUTPATIENT)
Dept: REHABILITATION | Facility: HOSPITAL | Age: 33
End: 2018-04-20
Payer: MEDICAID

## 2018-04-20 DIAGNOSIS — M53.3 SI (SACROILIAC) PAIN: ICD-10-CM

## 2018-04-20 DIAGNOSIS — R29.898 WEAKNESS OF RIGHT LOWER EXTREMITY: ICD-10-CM

## 2018-04-20 PROCEDURE — 97110 THERAPEUTIC EXERCISES: CPT | Mod: PO

## 2018-04-20 NOTE — PROGRESS NOTES
DAILY TREATMENT NOTE      Start Time:  11:00  Stop Time:  11:40    PROCEDURES:    TIMED  Procedure Min.   TE 25   MT 15                 UNTIMED  Procedure Min.             Total Timed Minutes:  40  Total Timed Units:  3  Total Untimed Units:  0  Charges Billed/# of units:  3      Progress/Current Status    Subjective:     Patient ID: Shelby AMIN is a 33 y.o. female.  Diagnosis:   1. Weakness of right lower extremity     2. SI (sacroiliac) pain       Pain: 3 /10  She reports she is still feeling the same stiffness, but not as much pain.     Objective:     Patient presented to the clinic with a level pelvis. She was educated and performed therapeutic exercises as per log, 1:1 with  PT x 25 minutes to improve her flexibility, strength, and core stability. STM/MFR was performed in supine x 15 minutes by PT. She declined a cold pack or moist heat at the end of the session.      Dry needling with trigger point/manual therapy techniques was performed in prone. Dry needling performed to R IT band homeostatic neuro-trigger point, symptomatic point in R IT band, and around R greater trochanter. All needles were removed and changes in signs and symptoms were noted, no adverse events at this time. Dry needling was performed to decrease inflammation, increase circulation, decrease pain and restore homeostasis. Dry needling consent form was reviewed with the patient addressing all questions and concerns and signed by patient. Copy of the consent form was provided to patient and copy of consent form scanned to patient Epic chart.    Date 04/20/18 04/18/18 04/13/18 04/11/18 04/06/18 03/26/18 03/23/18 03/21/18 03/16/18   Visit 10/20 9/20 8/20 7/20 6/20 5/20 4/20 3/20 2/20   FTF 05/06/18 05/06/18 05/06/18 05/06/18 05/06/18 04/07/18 04/07/18 04/07/18 04/07/18   FOTO -- -- -- -- -- 5/5 4/5 3/5 2/5   POC exp 05/07/18 05/07/18 05/07/18 05/07/18 05/07/18 05/07/18 05/07/18 05/07/18 05/07/18   MHP declined declined declined  "declined declined declined declined declined declined   MT 15' 15' 10' 10' 10' declined declined 10' --   HS stretech 10 x 10" 10 x 10" 10 x 10" 10 x 10" 10 x 10" 10 x 10" 10 x 10" 10 x 10" 10 x 10"   Piriformis stretch 5 x 10" 10 x 10" 10 x 10" 10 x 10" 10 x 10" 10 x 10" 10 x 10" 10 x 10" 10 x 10"   Supine:            TAs 10 x 5" 10 x 5" 10 x 5" 10 x 5" 10 x 5" 10 x 5" 10 x 5" 10 x 5" 10 x 5"               March -- -- 1 x 15 held 1 x 15 1 x 15 -- 1 x 15 1 x 10    Bug 1 x 10 1 x 10 -- held held held 1 x 10 -- --   Bridge held 1 x 20 1 x 20 1 x 15 1 x 15 1 x 15 1 x 15 1 x 15 1 x 10    SLR 2 x 10 2 x 10 -- 2 x 10 2 x 10 2 x 10 2 x 10 1 x 15 1 x 10    Hip abd  held 1 x 10 1 x 15 1 x 10 1 x 10 held 1 x 10 SL 1 x 15    Prone:            Alt LE oot oot 1 x 10 1 x 10 1 x 10 -- -- -- --   Alt LE/UE oot oot held held held 1 x 10 1 x 10 -- --   Seated:            HS curl oot oot held held held held 2 x 10 GTB 1 x 10 GTB     March oot oot Held - pain held Held held 1 x 15 1 x 10 1 x 10   LAQs oot oot 1 x 15 held 1 x 10 1 x 10 -- -- --   I's oot oot 1 x 10 GTB -- -- -- -- -- --   Rows oot oot 1 x 10 GTB -- -- -- -- -- --   Step ups lat oot oot held held held Held L1 1 x 10  Next?                  Initials DG DG DG DG DG DG DG DG  DG     Assessment:     Patient required frequent cues to keep her exercises in a pain free range. She was not able to perform all of her usual activities, due to complaints of pain around her R greater trochanter with all activities. She had increased complaints of pain with her palpation over her R greater trochanter, active hip abduction, and passive hip adduction. No adverse events noted with dry needling today.     Patient Education/Response:     Patient was instructed to continue to perform her HEP to her tolerance twice a day. She verbalized understanding instructions.     Plans and Goals:     Continue with the plan of care and progress as the patient tolerates.     Short Term Goals (4 Weeks): "   1. This patient will be independent with a basic HEP.  2. This patient will have R hip AROM pain free and WNL in order to dress with no increase in symptoms.  3. This patient will increase R LE strength by 1/2 grade in order to hold her 4 year old with no increase in symptoms.   4. This patient will have a pain rating of 5/10 at worst with ADLs.  5. Patient will be able to achieve greater than or equal to 75 on the FOTO Hip placing patient in 20%<40% impaired, limited, or restricted category demonstrating overall improved functional ability with lower extremity.   Long Term Goals (8 Weeks):   1. This patient will be independent with an updated HEP.  2. This patient will increase R LE strength to 5/5 in order to ascend/descend 4 flights of stairs with no increase in symptoms.   4. This patient will have a pain rating of 1/10 at worst with ADLs.  5. Patient will be able to achieve greater than or equal to 85 on the FOTO Hip placing patient in 1%<20% impaired, limited, or restricted category demonstrating overall improved functional ability with lower extremity.

## 2018-04-25 ENCOUNTER — TELEPHONE (OUTPATIENT)
Dept: REHABILITATION | Facility: HOSPITAL | Age: 33
End: 2018-04-25

## 2018-04-30 ENCOUNTER — CLINICAL SUPPORT (OUTPATIENT)
Dept: REHABILITATION | Facility: HOSPITAL | Age: 33
End: 2018-04-30
Payer: MEDICAID

## 2018-04-30 DIAGNOSIS — R29.898 WEAKNESS OF RIGHT LOWER EXTREMITY: ICD-10-CM

## 2018-04-30 DIAGNOSIS — M53.3 SI (SACROILIAC) PAIN: ICD-10-CM

## 2018-04-30 PROCEDURE — 97110 THERAPEUTIC EXERCISES: CPT | Mod: PO

## 2018-04-30 NOTE — PATIENT INSTRUCTIONS
Iliotibial Band Stretch, Standing        Stand, one hand on wall, same-side leg behind other leg. Lean hips toward wall, bending front knee, keeping back knee straight. Hold 10 seconds. Change foot position, lean to same-side. Hold 10 seconds.  Repeat 5 times per session. Do 2 sessions per day.    Iliotibial Band Stretch, Side-Lying        Lie on side, back to edge of bed, top arm in front. Allow top leg to drape behind over edge. Hold 10 seconds.   Repeat 5 times per session. Do 2 sessions per day.    Copyright © [employer]. All rights reserved.

## 2018-04-30 NOTE — PROGRESS NOTES
"DAILY TREATMENT NOTE      Start Time:  10:20  Stop Time: 11:00    PROCEDURES:    TIMED  Procedure Min.   TE 30   MT 10                 UNTIMED  Procedure Min.             Total Timed Minutes:  40  Total Timed Units:  3  Total Untimed Units:  0  Charges Billed/# of units:  3      Progress/Current Status    Subjective:     Patient ID: Shelby AMIN is a 33 y.o. female.  Diagnosis:   1. Weakness of right lower extremity     2. SI (sacroiliac) pain       Pain: 5 /10 at start, reports decreased pain at end of session   She reports still having some pain deep inside her hip, has been walking around the neighborhood 30-40 minutes a couple of times a week, it increases her pain.     Objective:     Patient arrived to the clinic 44 minutes early for her scheduled appointment. Patient presented to the clinic with a level pelvis. She was educated and performed therapeutic exercises as per log, 1:1 with  PT x 30 minutes to improve her flexibility, strength, and core stability. STM/MFR was performed in supine x 10 minutes by PT. She declined a cold pack or moist heat at the end of the session.        Date 04/30/18 04/20/18 04/18/18 04/13/18 04/11/18 04/06/18 03/26/18 03/23/18 03/21/18 03/16/18   Visit 11/20 10/20 9/20 8/20 7/20 6/20 5/20 4/20 3/20 2/20   FTF 05/06/18 05/06/18 05/06/18 05/06/18 05/06/18 05/06/18 04/07/18 04/07/18 04/07/18 04/07/18   FOTO -- -- -- -- -- -- 5/5 4/5 3/5 2/5   POC exp 05/07/18 05/07/18 05/07/18 05/07/18 05/07/18 05/07/18 05/07/18 05/07/18 05/07/18 05/07/18   MHP declined declined declined declined declined declined declined declined declined declined   MT 10' 15' 15' 10' 10' 10' declined declined 10' --   HS stretech 10 x 10" 10 x 10" 10 x 10" 10 x 10" 10 x 10" 10 x 10" 10 x 10" 10 x 10" 10 x 10" 10 x 10"   Piriformis stretch 5 x 10" 5 x 10" 10 x 10" 10 x 10" 10 x 10" 10 x 10" 10 x 10" 10 x 10" 10 x 10" 10 x 10"   IT band stretch 5 x 10" R            Supine:             TAs 10 x 5" 10 x 5" 10 x " "5" 10 x 5" 10 x 5" 10 x 5" 10 x 5" 10 x 5" 10 x 5" 10 x 5"                March -- -- -- 1 x 15 held 1 x 15 1 x 15 -- 1 x 15 1 x 10    Bug 1 x 10 1 x 10 1 x 10 -- held held held 1 x 10 -- --   Bridge 2 x 10 held 1 x 20 1 x 20 1 x 15 1 x 15 1 x 15 1 x 15 1 x 15 1 x 10    SLR 1 x 10 2 x 10 2 x 10 -- 2 x 10 2 x 10 2 x 10 2 x 10 1 x 15 1 x 10    Hip abd  1 x 10 held 1 x 10 1 x 15 1 x 10 1 x 10 held 1 x 10 SL 1 x 15    Prone:             Alt LE 1 x 10 oot oot 1 x 10 1 x 10 1 x 10 -- -- -- --   Alt LE/UE held oot oot held held held 1 x 10 1 x 10 -- --   Seated:             HS curl held oot oot held held held held 2 x 10 GTB 1 x 10 GTB     March 1 x 10 oot oot Held - pain held Held held 1 x 15 1 x 10 1 x 10   LAQs 1 x 10 oot oot 1 x 15 held 1 x 10 1 x 10 -- -- --   I's 1 x 10 GTB oot oot 1 x 10 GTB -- -- -- -- -- --   Rows 1 x 10 GTB oot oot 1 x 10 GTB -- -- -- -- -- --   Step ups lat held oot oot held held held Held L1 1 x 10  Next?                   Initials DG DG DG DG DG DG DG DG DG  DG     Assessment:     Patient continues to require cues to keep her exercises in a pain free range. She was able to perform all of today's activities and new exercise with no increase in symptoms prior to leaving the clinic. She had some discomfort with STM/MFR but felt better at the end of the session.     Patient Education/Response:     Patient was instructed to continue to perform her HEP to her tolerance twice a day. She was instructed again on the use of a tennis ball for self soft tissue mobilization. She verbalized understanding instructions.     Plans and Goals:     Continue with the plan of care and progress as the patient tolerates.     Short Term Goals (4 Weeks):   1. This patient will be independent with a basic HEP.  2. This patient will have R hip AROM pain free and WNL in order to dress with no increase in symptoms.  3. This patient will increase R LE strength by 1/2 grade in order to hold her 4 year old with no increase in " symptoms.   4. This patient will have a pain rating of 5/10 at worst with ADLs.  5. Patient will be able to achieve greater than or equal to 75 on the FOTO Hip placing patient in 20%<40% impaired, limited, or restricted category demonstrating overall improved functional ability with lower extremity.   Long Term Goals (8 Weeks):   1. This patient will be independent with an updated HEP.  2. This patient will increase R LE strength to 5/5 in order to ascend/descend 4 flights of stairs with no increase in symptoms.   4. This patient will have a pain rating of 1/10 at worst with ADLs.  5. Patient will be able to achieve greater than or equal to 85 on the FOTO Hip placing patient in 1%<20% impaired, limited, or restricted category demonstrating overall improved functional ability with lower extremity.

## 2018-05-02 ENCOUNTER — CLINICAL SUPPORT (OUTPATIENT)
Dept: REHABILITATION | Facility: HOSPITAL | Age: 33
End: 2018-05-02
Payer: MEDICAID

## 2018-05-02 DIAGNOSIS — R29.898 WEAKNESS OF RIGHT LOWER EXTREMITY: ICD-10-CM

## 2018-05-02 DIAGNOSIS — M53.3 SI (SACROILIAC) PAIN: ICD-10-CM

## 2018-05-02 PROCEDURE — 97110 THERAPEUTIC EXERCISES: CPT | Mod: PO

## 2018-05-02 NOTE — PROGRESS NOTES
"DAILY TREATMENT NOTE      Start Time:  8:00  Stop Time:  8:50    PROCEDURES:    TIMED  Procedure Min.   TE 15   MT 10   TE sup 25NC             UNTIMED  Procedure Min.             Total Timed Minutes:  25  Total Timed Units:  2  Total Untimed Units:  0  Charges Billed/# of units:  2      Progress/Current Status    Subjective:     Patient ID: Shelby AMIN is a 33 y.o. female.  Diagnosis:   1. Weakness of right lower extremity     2. SI (sacroiliac) pain       Pain: 4 /10 at start  She reports still getting a little stiff and having that deep ache inside the joint. She has a headache today.     Objective:     Patient arrived to the clinic 2 hours early for her scheduled appointment. Patient presented to the clinic with a level pelvis. She was educated and performed therapeutic exercises as per log, 1:1 with  PT x 15 minutes and supervised by PT x 25 minutes to improve her flexibility, strength, and core stability. STM/MFR was performed in supine x 10 minutes by PT to R piriformis and IT band. She declined a cold pack or moist heat at the end of the session.        Date 05/02/18 04/30/18 04/20/18 04/18/18 04/13/18 04/11/18 04/06/18 03/26/18 03/23/18 03/21/18 03/16/18   Visit 12/20 11/20 10/20 9/20 8/20 7/20 6/20 5/20 4/20 3/20 2/20   FTF 05/06/18 05/06/18 05/06/18 05/06/18 05/06/18 05/06/18 05/06/18 04/07/18 04/07/18 04/07/18 04/07/18   FOTO -- -- -- -- -- -- -- 5/5 4/5 3/5 2/5   POC exp 05/07/18 05/07/18 05/07/18 05/07/18 05/07/18 05/07/18 05/07/18 05/07/18 05/07/18 05/07/18 05/07/18   MHP declined declined declined declined declined declined declined declined declined declined declined   MT 10' 10' 15' 15' 10' 10' 10' declined declined 10' --   HS stretech 10 x 10" 10 x 10" 10 x 10" 10 x 10" 10 x 10" 10 x 10" 10 x 10" 10 x 10" 10 x 10" 10 x 10" 10 x 10"   Piriformis stretch held 5 x 10" 5 x 10" 10 x 10" 10 x 10" 10 x 10" 10 x 10" 10 x 10" 10 x 10" 10 x 10" 10 x 10"   IT band stretch 5 x 10" R 5 x 10" R        " "    Supine:              TAs 10 x 5" 10 x 5" 10 x 5" 10 x 5" 10 x 5" 10 x 5" 10 x 5" 10 x 5" 10 x 5" 10 x 5" 10 x 5"                 March -- -- -- -- 1 x 15 held 1 x 15 1 x 15 -- 1 x 15 1 x 10    Bug 1 x 15 1 x 10 1 x 10 1 x 10 -- held held held 1 x 10 -- --   Bridge 2 x 10 2 x 10 held 1 x 20 1 x 20 1 x 15 1 x 15 1 x 15 1 x 15 1 x 15 1 x 10    SLR 1 x 10 1 x 10 2 x 10 2 x 10 -- 2 x 10 2 x 10 2 x 10 2 x 10 1 x 15 1 x 10    Hip abd  held 1 x 10 held 1 x 10 1 x 15 1 x 10 1 x 10 held 1 x 10 SL 1 x 15    Prone:              Alt LE 1 x 15 1 x 10 oot oot 1 x 10 1 x 10 1 x 10 -- -- -- --   Alt LE/UE held held oot oot held held held 1 x 10 1 x 10 -- --   Seated:              HS curl held held oot oot held held held held 2 x 10 GTB 1 x 10 GTB     March 1 x 10 1 x 10 oot oot Held - pain held Held held 1 x 15 1 x 10 1 x 10   LAQs 1 x 10 1 x 10 oot oot 1 x 15 held 1 x 10 1 x 10 -- -- --   Standing              I's 1 x 10 GTB  1 x 10 GTB oot oot 1 x 10 GTB -- -- -- -- -- --   Rows 1 X 10 GTB 1 x 10 GTB oot oot 1 x 10 GTB -- -- -- -- -- --   Step ups lat held held oot oot held held held Held L1 1 x 10  Next?                    Initials DG DG DG DG DG DG DG DG DG DG  DG     Assessment:     Patient continued one cue to keep her exercises in a pain free range and for positioning for side lying exercises. She performed all of today's activities with no increase in symptoms prior to leaving the clinic. She had some discomfort with STM/MFR but felt better at the end of the session.     Patient Education/Response:     Patient was instructed to continue to perform her HEP to her tolerance twice a day. She verbalized understanding instructions.     Plans and Goals:     Continue with the plan of care and progress as the patient tolerates.     Short Term Goals (4 Weeks):   1. This patient will be independent with a basic HEP.  2. This patient will have R hip AROM pain free and WNL in order to dress with no increase in symptoms.  3. This " patient will increase R LE strength by 1/2 grade in order to hold her 4 year old with no increase in symptoms.   4. This patient will have a pain rating of 5/10 at worst with ADLs.  5. Patient will be able to achieve greater than or equal to 75 on the FOTO Hip placing patient in 20%<40% impaired, limited, or restricted category demonstrating overall improved functional ability with lower extremity.   Long Term Goals (8 Weeks):   1. This patient will be independent with an updated HEP.  2. This patient will increase R LE strength to 5/5 in order to ascend/descend 4 flights of stairs with no increase in symptoms.   4. This patient will have a pain rating of 1/10 at worst with ADLs.  5. Patient will be able to achieve greater than or equal to 85 on the FOTO Hip placing patient in 1%<20% impaired, limited, or restricted category demonstrating overall improved functional ability with lower extremity.

## 2018-05-07 ENCOUNTER — CLINICAL SUPPORT (OUTPATIENT)
Dept: REHABILITATION | Facility: HOSPITAL | Age: 33
End: 2018-05-07
Payer: MEDICAID

## 2018-05-07 ENCOUNTER — DOCUMENTATION ONLY (OUTPATIENT)
Dept: REHABILITATION | Facility: HOSPITAL | Age: 33
End: 2018-05-07

## 2018-05-07 DIAGNOSIS — M53.3 SI (SACROILIAC) PAIN: ICD-10-CM

## 2018-05-07 DIAGNOSIS — R29.898 WEAKNESS OF RIGHT LOWER EXTREMITY: ICD-10-CM

## 2018-05-07 PROCEDURE — 97110 THERAPEUTIC EXERCISES: CPT | Mod: PO

## 2018-05-07 NOTE — PROGRESS NOTES
Face to Face PTA Conference performed with Tu Escoto, PTA regarding patient's current status, overall progress, and plan of care    Face to Face PTA Conference performed with Kitty Guy, PT regarding patient's current status, overall progress, and plan of care    Tu Escoto  5/9/2018

## 2018-05-07 NOTE — PROGRESS NOTES
DAILY TREATMENT NOTE      Start Time:  11:00  Stop Time:  12:00    PROCEDURES:    TIMED  Procedure Min.   TE 20   MT 10   TE sup 30NC             UNTIMED  Procedure Min.             Total Timed Minutes:  30  Total Timed Units:  2  Total Untimed Units:  0  Charges Billed/# of units:  2      Progress/Current Status    Subjective:     Patient ID: Shelby AMIN is a 33 y.o. female.  Diagnosis:   1. Weakness of right lower extremity     2. SI (sacroiliac) pain       Pain: 3 /10 at start  She reports feeling a little better, but still having a deep ache in her hip. She tried to sit with legs crossed on floor on Friday but couldn't because she felt tight and had some pain.     Objective:     Patient presented to the clinic with a level pelvis. She was educated and performed therapeutic exercises as per log, along with today's reassessment 1:1 with  PT x 20 minutes and supervised by PT x 30 minutes to improve her flexibility, strength, and core stability. She declined a cold pack or moist heat at the end of the session.      Dry needling with trigger point/manual therapy techniques was performed. Dry needling performed to R greater trochanter and IT band x 10 minutes by PT along with STM.  All needles were removed and changes in signs and symptoms were noted as no adverse events. Dry needling was performed to decrease inflammation, increase circulation, decrease pain and restore homeostasis.  Dry needling consent form was reviewed with the patient addressing all questions and concerns and signed by patient.  Copy of the consent form was provided to patient and copy of consent form scanned to patient Epic chart.    Date 05/07/18 05/02/18 04/30/18 04/20/18 04/18/18 04/13/18 04/11/18 04/06/18 03/26/18 03/23/18 03/21/18 03/16/18   Visit 13/20 12/20 11/20 10/20 9/20 8/20 7/20 6/20 5/20 4/20 3/20 2/20   FTF 06/07/18 05/06/18 05/06/18 05/06/18 05/06/18 05/06/18 05/06/18 05/06/18 04/07/18 04/07/18 04/07/18 04/07/18   FOTO --  "-- -- -- -- -- -- -- 5/5 4/5 3/5 2/5   POC exp 05/07/18 05/07/18 05/07/18 05/07/18 05/07/18 05/07/18 05/07/18 05/07/18 05/07/18 05/07/18 05/07/18 05/07/18   MHP declined declined declined declined declined declined declined declined declined declined declined declined   MT 10' 10' 10' 15' 15' 10' 10' 10' declined declined 10' --   HS stretech 10 x 10" 10 x 10" 10 x 10" 10 x 10" 10 x 10" 10 x 10" 10 x 10" 10 x 10" 10 x 10" 10 x 10" 10 x 10" 10 x 10"   Piriformis stretch 5 x 10" held 5 x 10" 5 x 10" 10 x 10" 10 x 10" 10 x 10" 10 x 10" 10 x 10" 10 x 10" 10 x 10" 10 x 10"   IT band stretch 5 x 10" R 5 x 10" R 5 x 10" R            Supine:               TAs 10 x 5" 10 x 5" 10 x 5" 10 x 5" 10 x 5" 10 x 5" 10 x 5" 10 x 5" 10 x 5" 10 x 5" 10 x 5" 10 x 5"                  March  -- -- -- -- 1 x 15 held 1 x 15 1 x 15 -- 1 x 15 1 x 10    Bug 1 x 15 1 x 15 1 x 10 1 x 10 1 x 10 -- held held held 1 x 10 -- --   Bridge 1 x 10 2 x 10 2 x 10 held 1 x 20 1 x 20 1 x 15 1 x 15 1 x 15 1 x 15 1 x 15 1 x 10    SLR 1 x 10 1 x 10 1 x 10 2 x 10 2 x 10 -- 2 x 10 2 x 10 2 x 10 2 x 10 1 x 15 1 x 10    Hip abd  1 x 10 w/ pillow held 1 x 10 held 1 x 10 1 x 15 1 x 10 1 x 10 held 1 x 10 SL 1 x 15    Prone:               Alt LE 1 x 15 1 x 15 1 x 10 oot oot 1 x 10 1 x 10 1 x 10 -- -- -- --   Alt LE/UE held held held oot oot held held held 1 x 10 1 x 10 -- --   Seated:               HS curl held held held oot oot held held held held 2 x 10 GTB 1 x 10 GTB     March held 1 x 10 1 x 10 oot oot Held - pain held Held held 1 x 15 1 x 10 1 x 10   LAQs 1 x 10 1 x 10 1 x 10 oot oot 1 x 15 held 1 x 10 1 x 10 -- -- --   Standing               I's held 1 x 10 GTB  1 x 10 GTB oot oot 1 x 10 GTB -- -- -- -- -- --   Rows held 1 X 10 GTB 1 x 10 GTB oot oot 1 x 10 GTB -- -- -- -- -- --   Step ups lat held held held oot oot held held held Held L1 1 x 10  Next?                     Initials DG DG DG DG DG DG DG DG DG DG DG  DG       Hip Right  Left  Pain/Dysfunction with " Movement    AROM MMT AROM MMT    Flexion 125 5/5 125 5/5 Pain in hip   Extension NT 5/5 NT 5/5    Abduction 40 4/5 45 5/5    Adduction NT 5/5 NT 5/5    Internal rotation 40 5/5 45 5/5    External rotation 40 5/5 45 5/5       Knee Right  Left  Pain/Dysfunction with Movement    AROM MMT AROM MMT    Flexion WFL 5/5 WFL 5/5    Extension WFL 5/5 WFL 5/5      Ankle Right  Left  Pain/Dysfunction with Movement    AROM MMT AROM MMT    Plantarflexion WFL 5/5 WFL 5/5    Dorsiflexion WFL 5/5 WFL 5/5      SLR L 75*, R 70*  FOTO Hip 64, 20%<40% impaired, limited, or restricted  Assessment:     Patient continued one cue to keep her exercises in a pain free range and for positioning for side lying exercises. She performed all of today's activities with no increase in symptoms prior to leaving the clinic. She had some discomfort with STM/MFR but felt better at the end of the session. She is able to demonstrate an increase in R hip AROM and B LE strength compared to her initial evaluation, but continues to have complaints of R hip pain and tightness. When ambulating she has a normal gait pattern with no LOB. She also demonstrates an increase in B HS length with SLR. Her current score on FOTO Hip places her in the 20%<40% impaired, limited, or restricted category. She would benefit from continued physical therapy to address R hip pain and tightness.     Patient Education/Response:     Patient was instructed to continue to perform her HEP to her tolerance twice a day. She verbalized understanding instructions.     Plans and Goals:     Continue with the plan of care and progress as the patient tolerates.     Short Term Goals (4 Weeks):   1. This patient will be independent with a basic HEP. MET  2. This patient will have R hip AROM pain free and WNL in order to dress with no increase in symptoms. PARTIALLY MET  3. This patient will increase R LE strength by 1/2 grade in order to hold her 4 year old with no increase in symptoms.  PARTIALLY MET  4. This patient will have a pain rating of 5/10 at worst with ADLs. PARTIALLY MET  5. Patient will be able to achieve greater than or equal to 75 on the FOTO Hip placing patient in 20%<40% impaired, limited, or restricted category demonstrating overall improved functional ability with lower extremity. PARTIALLY MET  Long Term Goals (8 Weeks):   1. This patient will be independent with an updated HEP. MET  2. This patient will increase R LE strength to 5/5 in order to ascend/descend 4 flights of stairs with no increase in symptoms. PARTIALLY MET  4. This patient will have a pain rating of 1/10 at worst with ADLs. NOT MET  5. Patient will be able to achieve greater than or equal to 85 on the FOTO Hip placing patient in 1%<20% impaired, limited, or restricted category demonstrating overall improved functional ability with lower extremity. NOT MET

## 2018-05-10 NOTE — PLAN OF CARE
Date: 5/7/2018      PHYSICAL THERAPY UPDATED PLAN OF TREATMENT    Patient name: Shelby AMIN  Onset Date:  Jan 2018  SOC Date:  03/07/18  Primary Diagnosis: weakness, SI pain, decreased functional mobility  Treatment Diagnosis:    1. Weakness of right lower extremity     2. SI (sacroiliac) pain       Referring Physician: Jocye Salazar MD  Certification Period:  03/07/18 to 05/07/18  Precautions:  Standard  Visits from SOC:  13  Functional Level Prior to SOC:  Independent  Updated Subjective: Pain: 3 /10 at start  She reports feeling a little better, but still having a deep ache in her hip. She tried to sit with legs crossed on floor on Friday but couldn't because she felt tight and had some pain.   Updated Objective:   Hip Right  Left  Pain/Dysfunction with Movement    AROM MMT AROM MMT    Flexion 125 5/5 125 5/5 Pain in hip   Extension NT 5/5 NT 5/5    Abduction 40 4/5 45 5/5    Adduction NT 5/5 NT 5/5    Internal rotation 40 5/5 45 5/5    External rotation 40 5/5 45 5/5       Knee Right  Left  Pain/Dysfunction with Movement    AROM MMT AROM MMT    Flexion WFL 5/5 WFL 5/5    Extension WFL 5/5 WFL 5/5      Ankle Right  Left  Pain/Dysfunction with Movement    AROM MMT AROM MMT    Plantarflexion WFL 5/5 WFL 5/5    Dorsiflexion WFL 5/5 WFL 5/5      SLR L 75*, R 70*  FOTO Hip 64, 20%<40% impaired, limited, or restricted  Updated Assessment:  Patient continued one cue to keep her exercises in a pain free range and for positioning for side lying exercises. She performed all of today's activities with no increase in symptoms prior to leaving the clinic. She had some discomfort with STM/MFR but felt better at the end of the session. She is able to demonstrate an increase in R hip AROM and B LE strength compared to her initial evaluation, but continues to have complaints of R hip pain and tightness. When ambulating she has a normal gait pattern with no LOB. She also demonstrates an increase in B HS length with  SLR. Her current score on FOTO Hip places her in the 20%<40% impaired, limited, or restricted category. She would benefit from continued physical therapy to address R hip pain and tightness.     Previous Short Term Goals Status:     Short Term Goals (4 Weeks):   1. This patient will be independent with a basic HEP. MET  2. This patient will have R hip AROM pain free and WNL in order to dress with no increase in symptoms. PARTIALLY MET  3. This patient will increase R LE strength by 1/2 grade in order to hold her 4 year old with no increase in symptoms. PARTIALLY MET  4. This patient will have a pain rating of 5/10 at worst with ADLs. PARTIALLY MET  5. Patient will be able to achieve greater than or equal to 75 on the FOTO Hip placing patient in 20%<40% impaired, limited, or restricted category demonstrating overall improved functional ability with lower extremity. PARTIALLY MET  Long Term Goals (8 Weeks):   1. This patient will be independent with an updated HEP. MET  2. This patient will increase R LE strength to 5/5 in order to ascend/descend 4 flights of stairs with no increase in symptoms. PARTIALLY MET  3. This patient will have a pain rating of 1/10 at worst with ADLs. NOT MET  4. Patient will be able to achieve greater than or equal to 85 on the FOTO Hip placing patient in 1%<20% impaired, limited, or restricted category demonstrating overall improved functional ability with lower extremity. NOT MET  New Short Term Goals Status:   Continue with STG #2-5 & LTG #2-4  Long Term Goal Status:   continue per initial plan of care.  Reasons for Recertification of Therapy:   Continued pain, muscle tightness, and decreased functional mobilty    Certification Period: 05/07/18 to 06/07/18  Recommended Treatment Plan: 2 times per week for 4 weeks: Group Therapy, Manual Therapy, Moist Heat/ Ice, Neuromuscular Re-ed, Patient Education and Therapeutic Exercise  Other Recommendations: Dry needling with manual therapy  techniques to decrease pain, inflammation and swelling, increase circulation and promote healing process        Kitty Guy, PT  5/7/2018      I CERTIFY THE NEED FOR THESE SERVICES FURNISHED UNDER THIS PLAN OF TREATMENT AND WHILE UNDER MY CARE    Physician's comments: ________________________________________________________________________________________________________________________________________________      Physician's Name: ___________________________________

## 2018-05-11 ENCOUNTER — CLINICAL SUPPORT (OUTPATIENT)
Dept: REHABILITATION | Facility: HOSPITAL | Age: 33
End: 2018-05-11
Payer: MEDICAID

## 2018-05-11 DIAGNOSIS — M53.3 SI (SACROILIAC) PAIN: ICD-10-CM

## 2018-05-11 DIAGNOSIS — R29.898 WEAKNESS OF RIGHT LOWER EXTREMITY: ICD-10-CM

## 2018-05-11 PROCEDURE — 97110 THERAPEUTIC EXERCISES: CPT | Mod: PO

## 2018-05-11 NOTE — PROGRESS NOTES
"DAILY TREATMENT NOTE      Start Time:  9:00  Stop Time:  10:00    PROCEDURES:    TIMED  Procedure Min.   TE 30   MT 10   TE sup 10NC   CP 10NC         UNTIMED  Procedure Min.             Total Timed Minutes:  40  Total Timed Units:  3  Total Untimed Units:  0  Charges Billed/# of units:  3      Progress/Current Status    Subjective:     Patient ID: Shelby AMIN is a 33 y.o. female.  Diagnosis:   1. Weakness of right lower extremity     2. SI (sacroiliac) pain       Pain: 6 /10   She is still having the sharp pain in her hip, it is off and on. She is still feeling stiff.     Objective:     Patient presented to the clinic with a level pelvis. She was educated and performed therapeutic exercises as per log, along with today's new exercises 1:1 with  PT x 30 minutes and supervised by PT x 13 minutes to improve her flexibility, strength, and core stability. Dry needling was held today, but STM/MFR was performed to R piriformis and IT band x 10 minutes by PT. She recieved a cold pack to R piriformis/hip area in side lying at the end of the session x 10 minutes.       Date 05/11/18 05/07/18 05/02/18 04/30/18 04/20/18 04/18/18 04/13/18 04/11/18 04/06/18 03/26/18 03/23/18 03/21/18 03/16/18   Visit 14/20 13/20 12/20 11/20 10/20 9/20 8/20 7/20 6/20 5/20 4/20 3/20 2/20   FTF 06/07/18 06/07/18 05/06/18 05/06/18 05/06/18 05/06/18 05/06/18 05/06/18 05/06/18 04/07/18 04/07/18 04/07/18 04/07/18   FOTO -- -- -- -- -- -- -- -- -- 5/5 4/5 3/5 2/5   POC exp 06/07/18 05/07/18 05/07/18 05/07/18 05/07/18 05/07/18 05/07/18 05/07/18 05/07/18 05/07/18 05/07/18 05/07/18 05/07/18   MHP declined declined declined declined declined declined declined declined declined declined declined declined declined   MT 10' 10' 10' 10' 15' 15' 10' 10' 10' declined declined 10' --   HS stretech 10 x 10" 10 x 10" 10 x 10" 10 x 10" 10 x 10" 10 x 10" 10 x 10" 10 x 10" 10 x 10" 10 x 10" 10 x 10" 10 x 10" 10 x 10"   Piriformis stretch 5 x 10" 5 x 10" held 5 " "x 10" 5 x 10" 10 x 10" 10 x 10" 10 x 10" 10 x 10" 10 x 10" 10 x 10" 10 x 10" 10 x 10"   IT band stretch 5 x 10"R 5 x 10" R 5 x 10" R 5 x 10" R            Supine:                TAs 10 x 5" 10 x 5" 10 x 5" 10 x 5" 10 x 5" 10 x 5" 10 x 5" 10 x 5" 10 x 5" 10 x 5" 10 x 5" 10 x 5" 10 x 5"                   March   -- -- -- -- 1 x 15 held 1 x 15 1 x 15 -- 1 x 15 1 x 10    Bug 1 x 15 1 x 15 1 x 15 1 x 10 1 x 10 1 x 10 -- held held held 1 x 10 -- --   Bridge 2 x 10 1 x 10 2 x 10 2 x 10 held 1 x 20 1 x 20 1 x 15 1 x 15 1 x 15 1 x 15 1 x 15 1 x 10    SLR held 1 x 10 1 x 10 1 x 10 2 x 10 2 x 10 -- 2 x 10 2 x 10 2 x 10 2 x 10 1 x 15 1 x 10    Hip abd  held 1 x 10 w/ pillow held 1 x 10 held 1 x 10 1 x 15 1 x 10 1 x 10 held 1 x 10 SL 1 x 15    Prone:                Alt LE held 1 x 15 1 x 15 1 x 10 oot oot 1 x 10 1 x 10 1 x 10 -- -- -- --   Alt LE/UE held held held held oot oot held held held 1 x 10 1 x 10 -- --   Seated:                HS curl held held held held oot oot held held held held 2 x 10 GTB 1 x 10 GTB     March held held 1 x 10 1 x 10 oot oot Held - pain held Held held 1 x 15 1 x 10 1 x 10   LAQs held 1 x 10 1 x 10 1 x 10 oot oot 1 x 15 held 1 x 10 1 x 10 -- -- --   Standing                I's held held 1 x 10 GTB  1 x 10 GTB oot oot 1 x 10 GTB -- -- -- -- -- --   Rows held held 1 X 10 GTB 1 x 10 GTB oot oot 1 x 10 GTB -- -- -- -- -- --   Step ups lat held held held held oot oot held held held Held L1 1 x 10  Next?    Hip abd 1 x 10               Hip ext 1 x 10                               CP 10'                 Initials DG DG DG DG DG DG DG DG DG DG DG DG  DG       Assessment:     Patient required one cue to keep her exercise in a pain free range with IT band stretch. She was able to perform today's new exercises with no increase in symptoms prior to leaving the clinic. She attempted to perform monster walks without resistance but was unable to do so due to increased pain. She would benefit from continued physical " therapy to address R hip pain and tightness.     Patient Education/Response:     Patient was instructed to continue to perform her HEP to her tolerance twice a day. She verbalized understanding instructions.     Plans and Goals:     Continue with the plan of care and progress as the patient tolerates.     Short Term Goals (4 Weeks):   1. This patient will be independent with a basic HEP. MET  2. This patient will have R hip AROM pain free and WNL in order to dress with no increase in symptoms. PARTIALLY MET  3. This patient will increase R LE strength by 1/2 grade in order to hold her 4 year old with no increase in symptoms. PARTIALLY MET  4. This patient will have a pain rating of 5/10 at worst with ADLs. PARTIALLY MET  5. Patient will be able to achieve greater than or equal to 75 on the FOTO Hip placing patient in 20%<40% impaired, limited, or restricted category demonstrating overall improved functional ability with lower extremity. PARTIALLY MET  Long Term Goals (8 Weeks):   1. This patient will be independent with an updated HEP. MET  2. This patient will increase R LE strength to 5/5 in order to ascend/descend 4 flights of stairs with no increase in symptoms. PARTIALLY MET  4. This patient will have a pain rating of 1/10 at worst with ADLs. NOT MET  5. Patient will be able to achieve greater than or equal to 85 on the FOTO Hip placing patient in 1%<20% impaired, limited, or restricted category demonstrating overall improved functional ability with lower extremity. NOT MET

## 2018-05-14 ENCOUNTER — CLINICAL SUPPORT (OUTPATIENT)
Dept: REHABILITATION | Facility: HOSPITAL | Age: 33
End: 2018-05-14
Payer: MEDICAID

## 2018-05-14 DIAGNOSIS — R29.898 WEAKNESS OF RIGHT LOWER EXTREMITY: ICD-10-CM

## 2018-05-14 DIAGNOSIS — M53.3 SI (SACROILIAC) PAIN: ICD-10-CM

## 2018-05-14 PROCEDURE — 97110 THERAPEUTIC EXERCISES: CPT | Mod: PO

## 2018-05-14 NOTE — PROGRESS NOTES
"DAILY TREATMENT NOTE    DATE: 5/14/2018    Start Time:  10:00  Stop Time:  10:38    PROCEDURES:    TIMED  Procedure Min.   TE 38                     UNTIMED  Procedure Min.             Total Timed Minutes:  38  Total Timed Units:  3  Total Untimed Units:  0  Charges Billed/# of units:  3 (TE-3)      Progress/Current Status    Subjective:     Patient ID: Shelby AMIN is a 33 y.o. female.  Diagnosis:   1. Weakness of right lower extremity     2. SI (sacroiliac) pain       Pain: 4 /10  Patient reports her pain is not too bad today.    Objective:     Patient presented to the clinic with a level pelvis. She was educated and performed therapeutic exercises as per log, along with today's new exercises 1:1 with  PTA x 38 minutes improve her flexibility, strength, and core stability.  Shelby received 10 minutes of dry needling performed by Kitty Cleaning PT. See separate note dated 5/14/2018 for details.  Patient declined cold pack at the end of the session.       Date 05/14/18 05/11/18 05/07/18 05/02/18 04/30/18 04/20/18 04/18/18 04/13/18 04/11/18 04/06/18 03/26/18 03/23/18 03/21/18 03/16/18   Visit 15/20 14/20 13/20 12/20 11/20 10/20 9/20 8/20 7/20 6/20 5/20 4/20 3/20 2/20   FTF 06/07/18 06/07/18 06/07/18 05/06/18 05/06/18 05/06/18 05/06/18 05/06/18 05/06/18 05/06/18 04/07/18 04/07/18 04/07/18 04/07/18   FOTO -- -- -- -- -- -- -- -- -- -- 5/5 4/5 3/5 2/5   POC exp 06/07/18 06/07/18 05/07/18 05/07/18 05/07/18 05/07/18 05/07/18 05/07/18 05/07/18 05/07/18 05/07/18 05/07/18 05/07/18 05/07/18                    MHP declined declined declined declined declined declined declined declined declined declined declined declined declined declined   MT See PT note 10' 10' 10' 10' 15' 15' 10' 10' 10' declined declined 10' --   Supine:                 HS stretech 10 x 10" 10 x 10" 10 x 10" 10 x 10" 10 x 10" 10 x 10" 10 x 10" 10 x 10" 10 x 10" 10 x 10" 10 x 10" 10 x 10" 10 x 10" 10 x 10"   Piriformis stretch 5 x 10" 5 x 10" 5 x 10" " "held 5 x 10" 5 x 10" 10 x 10" 10 x 10" 10 x 10" 10 x 10" 10 x 10" 10 x 10" 10 x 10" 10 x 10"   IT band stretch 5 x 10" R 5 x 10"R 5 x 10" R 5 x 10" R 5 x 10" R            TAs 10 x 5" 10 x 5" 10 x 5" 10 x 5" 10 x 5" 10 x 5" 10 x 5" 10 x 5" 10 x 5" 10 x 5" 10 x 5" 10 x 5" 10 x 5" 10 x 5"   March --   -- -- -- -- 1 x 15 held 1 x 15 1 x 15 -- 1 x 15 1 x 10    Bug 1 x 15 1 x 15 1 x 15 1 x 15 1 x 10 1 x 10 1 x 10 -- held held held 1 x 10 -- --   Bridge 2 x 10 2 x 10 1 x 10 2 x 10 2 x 10 held 1 x 20 1 x 20 1 x 15 1 x 15 1 x 15 1 x 15 1 x 15 1 x 10    SLR 1 x 10 held 1 x 10 1 x 10 1 x 10 2 x 10 2 x 10 -- 2 x 10 2 x 10 2 x 10 2 x 10 1 x 15 1 x 10    Hip abd  1 x 10 RTB held 1 x 10 w/ pillow held 1 x 10 held 1 x 10 1 x 15 1 x 10 1 x 10 held 1 x 10 SL 1 x 15    Prone:                 Alt LE 1 x 10 held 1 x 15 1 x 15 1 x 10 oot oot 1 x 10 1 x 10 1 x 10 -- -- -- --   Alt LE/UE held held held held held oot oot held held held 1 x 10 1 x 10 -- --   Seated:                 HS curl held held held held held oot oot held held held held 2 x 10 GTB 1 x 10 GTB     March held held held 1 x 10 1 x 10 oot oot Held - pain held Held held 1 x 15 1 x 10 1 x 10   LAQs 1 x 10 held 1 x 10 1 x 10 1 x 10 oot oot 1 x 15 held 1 x 10 1 x 10 -- -- --   Standing                 I's 1 x 10 GTB held held 1 x 10 GTB  1 x 10 GTB oot oot 1 x 10 GTB -- -- -- -- -- --   Rows 1 x 10 GTB held held 1 X 10 GTB 1 x 10 GTB oot oot 1 x 10 GTB -- -- -- -- -- --   Step ups lat held held held held held oot oot held held held Held L1 1 x 10  Next?    Hip abd 1 x 10 1 x 10               Hip ext 1 x 10 1 x 10                                CP declined 10'                                  Initials GWA 1/6 DG DG DG DG DG DG DG DG DG DG DG DG  DG       Assessment:     Patient required occasional cues to keep her exercise in a pain free range.  Patient completed today's therapy with no increase in symptoms prior to leaving the clinic.  She would benefit from continued physical " therapy to address R hip pain and tightness.     Patient Education/Response:     Patient was instructed to continue to perform her HEP to her tolerance twice a day. She verbalized understanding instructions.     Plans and Goals:     Continue with the plan of care and progress as the patient tolerates.     Short Term Goals (4 Weeks):   1. This patient will be independent with a basic HEP. MET  2. This patient will have R hip AROM pain free and WNL in order to dress with no increase in symptoms. PARTIALLY MET  3. This patient will increase R LE strength by 1/2 grade in order to hold her 4 year old with no increase in symptoms. PARTIALLY MET  4. This patient will have a pain rating of 5/10 at worst with ADLs. PARTIALLY MET  5. Patient will be able to achieve greater than or equal to 75 on the FOTO Hip placing patient in 20%<40% impaired, limited, or restricted category demonstrating overall improved functional ability with lower extremity. PARTIALLY MET    Long Term Goals (8 Weeks):   1. This patient will be independent with an updated HEP. MET  2. This patient will increase R LE strength to 5/5 in order to ascend/descend 4 flights of stairs with no increase in symptoms. PARTIALLY MET  4. This patient will have a pain rating of 1/10 at worst with ADLs. NOT MET  5. Patient will be able to achieve greater than or equal to 85 on the FOTO Hip placing patient in 1%<20% impaired, limited, or restricted category demonstrating overall improved functional ability with lower extremity. NOT MET

## 2018-05-16 ENCOUNTER — CLINICAL SUPPORT (OUTPATIENT)
Dept: REHABILITATION | Facility: HOSPITAL | Age: 33
End: 2018-05-16
Payer: MEDICAID

## 2018-05-16 DIAGNOSIS — R29.898 WEAKNESS OF RIGHT LOWER EXTREMITY: ICD-10-CM

## 2018-05-16 DIAGNOSIS — M53.3 SI (SACROILIAC) PAIN: ICD-10-CM

## 2018-05-16 PROCEDURE — 97110 THERAPEUTIC EXERCISES: CPT | Mod: PO

## 2018-05-16 NOTE — PROGRESS NOTES
"DAILY TREATMENT NOTE    DATE: 5/16/2018    Start Time:  9:00  Stop Time:  9:55    PROCEDURES:    TIMED  Procedure Min.   TE 30   TE sup 15NC                 UNTIMED  Procedure Min.   CP 10NC         Total Timed Minutes:  30  Total Timed Units:  2  Total Untimed Units:  0  Charges Billed/# of units:  2 (TE-2)      Progress/Current Status    Subjective:     Patient ID: Shelby AMIN is a 33 y.o. female.  Diagnosis:   1. Weakness of right lower extremity     2. SI (sacroiliac) pain       Pain: 3 /10  Patient reports hurting more yesterday, today not as much. She is still having the deep ache in the same spot.    Objective:     Patient presented to the clinic with a level pelvis. She was educated and performed therapeutic exercises as per log, along with today's progressions 1:1 with  PT x 30 minutes and supervised by PT x 15 minutes to improve her flexibility, strength, and core stability.  Patient received a cold pack at the end of the session in side lying to R hip x 10 minutes.       Date 05/16/18 05/14/18 05/11/18 05/07/18 05/02/18 04/30/18 04/20/18 04/18/18 04/13/18 04/11/18 04/06/18 03/26/18 03/23/18 03/21/18 03/16/18   Visit 16/20 15/20 14/20 13/20 12/20 11/20 10/20 9/20 8/20 7/20 6/20 5/20 4/20 3/20 2/20   FTF 06/07/18 06/07/18 06/07/18 06/07/18 05/06/18 05/06/18 05/06/18 05/06/18 05/06/18 05/06/18 05/06/18 04/07/18 04/07/18 04/07/18 04/07/18   FOTO -- -- -- -- -- -- -- -- -- -- -- 5/5 4/5 3/5 2/5   POC exp 06/07/18 06/07/18 06/07/18 05/07/18 05/07/18 05/07/18 05/07/18 05/07/18 05/07/18 05/07/18 05/07/18 05/07/18 05/07/18 05/07/18 05/07/18                     MHP declined declined declined declined declined declined declined declined declined declined declined declined declined declined declined   MT held See PT note 10' 10' 10' 10' 15' 15' 10' 10' 10' declined declined 10' --   Supine:                  HS stretech 10 x 10" 10 x 10" 10 x 10" 10 x 10" 10 x 10" 10 x 10" 10 x 10" 10 x 10" 10 x 10" 10 x 10" " "10 x 10" 10 x 10" 10 x 10" 10 x 10" 10 x 10"   Piriformis stretch 5 x 10" 5 x 10" 5 x 10" 5 x 10" held 5 x 10" 5 x 10" 10 x 10" 10 x 10" 10 x 10" 10 x 10" 10 x 10" 10 x 10" 10 x 10" 10 x 10"   IT band stretch 10 x 5" 5 x 10" R 5 x 10"R 5 x 10" R 5 x 10" R 5 x 10" R            TAs 10 x 5" 10 x 5" 10 x 5" 10 x 5" 10 x 5" 10 x 5" 10 x 5" 10 x 5" 10 x 5" 10 x 5" 10 x 5" 10 x 5" 10 x 5" 10 x 5" 10 x 5"   March -- --   -- -- -- -- 1 x 15 held 1 x 15 1 x 15 -- 1 x 15 1 x 10    Bug 2 x 10 1 x 15 1 x 15 1 x 15 1 x 15 1 x 10 1 x 10 1 x 10 -- held held held 1 x 10 -- --   Bridge 3 x 10 2 x 10 2 x 10 1 x 10 2 x 10 2 x 10 held 1 x 20 1 x 20 1 x 15 1 x 15 1 x 15 1 x 15 1 x 15 1 x 10    SLR held 1 x 10 held 1 x 10 1 x 10 1 x 10 2 x 10 2 x 10 -- 2 x 10 2 x 10 2 x 10 2 x 10 1 x 15 1 x 10    Hip abd  1 x 15 RTB 1 x 10 RTB held 1 x 10 w/ pillow held 1 x 10 held 1 x 10 1 x 15 1 x 10 1 x 10 held 1 x 10 SL 1 x 15    Prone:                  Alt LE 1 x 15 1 x 10 held 1 x 15 1 x 15 1 x 10 oot oot 1 x 10 1 x 10 1 x 10 -- -- -- --   Alt LE/UE held held held held held held oot oot held held held 1 x 10 1 x 10 -- --   Seated:                  HS curl held held held held held held oot oot held held held held 2 x 10 GTB 1 x 10 GTB     March 1 x 10 held held held 1 x 10 1 x 10 oot oot Held - pain held Held held 1 x 15 1 x 10 1 x 10   LAQs 1 x 15 1 x 10 held 1 x 10 1 x 10 1 x 10 oot oot 1 x 15 held 1 x 10 1 x 10 -- -- --   Standing                  I's 1 x 10 GTB 1 x 10 GTB held held 1 x 10 GTB  1 x 10 GTB oot oot 1 x 10 GTB -- -- -- -- -- --   Rows 1 x 10 GTB 1 x 10 GTB held held 1 X 10 GTB 1 x 10 GTB oot oot 1 x 10 GTB -- -- -- -- -- --   Step ups lat held held held held held held oot oot held held held Held L1 1 x 10  Next?    Hip abd 1 x 10 1 x 10 1 x 10               Hip ext 1 x 10 1 x 10 1 x 10                                 CP 10' declined 10'                                   Initials DG GWA 1/6 DG DG DG DG DG DG DG DG DG DG DG DG  DG "       Assessment:     Patient was able to complete all of today's therapy with no increase in symptoms prior to leaving the clinic. She continues to require occasional cues to keep her exercises in a pain free range, especially her piriformis stretch.  She would benefit from continued physical therapy to address R hip pain and tightness.     Patient Education/Response:     Patient was instructed to continue to perform her HEP to her tolerance twice a day. She verbalized understanding instructions.     Plans and Goals:     Continue with the plan of care and progress as the patient tolerates.     Short Term Goals (4 Weeks):   1. This patient will be independent with a basic HEP. MET  2. This patient will have R hip AROM pain free and WNL in order to dress with no increase in symptoms. PARTIALLY MET  3. This patient will increase R LE strength by 1/2 grade in order to hold her 4 year old with no increase in symptoms. PARTIALLY MET  4. This patient will have a pain rating of 5/10 at worst with ADLs. PARTIALLY MET  5. Patient will be able to achieve greater than or equal to 75 on the FOTO Hip placing patient in 20%<40% impaired, limited, or restricted category demonstrating overall improved functional ability with lower extremity. PARTIALLY MET    Long Term Goals (8 Weeks):   1. This patient will be independent with an updated HEP. MET  2. This patient will increase R LE strength to 5/5 in order to ascend/descend 4 flights of stairs with no increase in symptoms. PARTIALLY MET  4. This patient will have a pain rating of 1/10 at worst with ADLs. NOT MET  5. Patient will be able to achieve greater than or equal to 85 on the FOTO Hip placing patient in 1%<20% impaired, limited, or restricted category demonstrating overall improved functional ability with lower extremity. NOT MET

## 2018-05-16 NOTE — PROGRESS NOTES
DAILY TREATMENT NOTE    DATE: 5/14/2018    Start Time:  10:38  Stop Time:  10:48    PROCEDURES:    TIMED  Procedure Min.   MT 10                     UNTIMED  Procedure Min.             Total Timed Minutes:  10  Total Timed Units:  1  Total Untimed Units:  0  Charges Billed/# of units:  1      Progress/Current Status    Subjective:     Patient ID: Shelby AMIN is a 33 y.o. female.  Diagnosis:   1. Weakness of right lower extremity     2. SI (sacroiliac) pain       Pain: 4 /10  See daily progress note by Tu Escoto PTA    Objective:     Dry needling with trigger point/manual therapy techniques was performed. Dry needling performed to R greater trochanter and IT band x 10 minutes by PT along with STM.  All needles were removed and changes in signs and symptoms were noted as no adverse events. Dry needling was performed to decrease inflammation, increase circulation, decrease pain and restore homeostasis.  Dry needling consent form was reviewed with the patient addressing all questions and concerns and signed by patient.  Copy of the consent form was provided to patient and copy of consent form scanned to patient Epic chart.    See daily progress note by Tu Escoto PTA  Assessment:     No adverse events noted with dry needling. See daily progress note by Tu Escoto PTA for additional assessment.    Patient Education/Response:     See daily progress note by Tu Escoto PTA    Plans and Goals:     See daily progress note by Tu Escoto PTA

## 2021-02-09 ENCOUNTER — TELEPHONE (OUTPATIENT)
Dept: ADMINISTRATIVE | Facility: HOSPITAL | Age: 36
End: 2021-02-09

## 2021-02-09 ENCOUNTER — OFFICE VISIT (OUTPATIENT)
Dept: FAMILY MEDICINE | Facility: CLINIC | Age: 36
End: 2021-02-09
Payer: COMMERCIAL

## 2021-02-09 VITALS
SYSTOLIC BLOOD PRESSURE: 114 MMHG | HEART RATE: 74 BPM | HEIGHT: 62 IN | BODY MASS INDEX: 29.8 KG/M2 | OXYGEN SATURATION: 100 % | WEIGHT: 161.94 LBS | TEMPERATURE: 98 F | DIASTOLIC BLOOD PRESSURE: 80 MMHG

## 2021-02-09 DIAGNOSIS — G56.00 CARPAL TUNNEL SYNDROME, UNSPECIFIED LATERALITY: Primary | ICD-10-CM

## 2021-02-09 DIAGNOSIS — Z00.00 WELLNESS EXAMINATION: ICD-10-CM

## 2021-02-09 DIAGNOSIS — M79.671 RIGHT FOOT PAIN: ICD-10-CM

## 2021-02-09 DIAGNOSIS — L98.9 SKIN LESIONS: ICD-10-CM

## 2021-02-09 DIAGNOSIS — Z11.59 ENCOUNTER FOR HEPATITIS C SCREENING TEST FOR LOW RISK PATIENT: ICD-10-CM

## 2021-02-09 PROBLEM — R29.898 WEAKNESS OF RIGHT LOWER EXTREMITY: Status: RESOLVED | Noted: 2018-03-08 | Resolved: 2021-02-09

## 2021-02-09 PROBLEM — M53.3 SI (SACROILIAC) PAIN: Status: RESOLVED | Noted: 2018-03-08 | Resolved: 2021-02-09

## 2021-02-09 PROCEDURE — 3008F BODY MASS INDEX DOCD: CPT | Mod: CPTII,S$GLB,, | Performed by: INTERNAL MEDICINE

## 2021-02-09 PROCEDURE — 1125F PR PAIN SEVERITY QUANTIFIED, PAIN PRESENT: ICD-10-PCS | Mod: S$GLB,,, | Performed by: INTERNAL MEDICINE

## 2021-02-09 PROCEDURE — 3008F PR BODY MASS INDEX (BMI) DOCUMENTED: ICD-10-PCS | Mod: CPTII,S$GLB,, | Performed by: INTERNAL MEDICINE

## 2021-02-09 PROCEDURE — 99204 PR OFFICE/OUTPT VISIT, NEW, LEVL IV, 45-59 MIN: ICD-10-PCS | Mod: S$GLB,,, | Performed by: INTERNAL MEDICINE

## 2021-02-09 PROCEDURE — 1125F AMNT PAIN NOTED PAIN PRSNT: CPT | Mod: S$GLB,,, | Performed by: INTERNAL MEDICINE

## 2021-02-09 PROCEDURE — 99999 PR PBB SHADOW E&M-EST. PATIENT-LVL III: CPT | Mod: PBBFAC,,, | Performed by: INTERNAL MEDICINE

## 2021-02-09 PROCEDURE — 99999 PR PBB SHADOW E&M-EST. PATIENT-LVL III: ICD-10-PCS | Mod: PBBFAC,,, | Performed by: INTERNAL MEDICINE

## 2021-02-09 PROCEDURE — 99204 OFFICE O/P NEW MOD 45 MIN: CPT | Mod: S$GLB,,, | Performed by: INTERNAL MEDICINE

## 2021-02-12 ENCOUNTER — TELEPHONE (OUTPATIENT)
Dept: FAMILY MEDICINE | Facility: CLINIC | Age: 36
End: 2021-02-12

## 2021-02-12 ENCOUNTER — PATIENT MESSAGE (OUTPATIENT)
Dept: FAMILY MEDICINE | Facility: CLINIC | Age: 36
End: 2021-02-12

## 2021-02-17 ENCOUNTER — OFFICE VISIT (OUTPATIENT)
Dept: DERMATOLOGY | Facility: CLINIC | Age: 36
End: 2021-02-17
Payer: COMMERCIAL

## 2021-02-17 DIAGNOSIS — L81.4 LENTIGO: ICD-10-CM

## 2021-02-17 DIAGNOSIS — L91.8 SKIN TAG: ICD-10-CM

## 2021-02-17 DIAGNOSIS — D22.9 MULTIPLE BENIGN NEVI: ICD-10-CM

## 2021-02-17 DIAGNOSIS — D48.5 NEOPLASM OF UNCERTAIN BEHAVIOR OF SKIN: ICD-10-CM

## 2021-02-17 DIAGNOSIS — Z12.83 SCREENING EXAM FOR SKIN CANCER: ICD-10-CM

## 2021-02-17 DIAGNOSIS — L82.1 SK (SEBORRHEIC KERATOSIS): Primary | ICD-10-CM

## 2021-02-17 PROCEDURE — 99999 PR PBB SHADOW E&M-EST. PATIENT-LVL III: ICD-10-PCS | Mod: PBBFAC,,, | Performed by: DERMATOLOGY

## 2021-02-17 PROCEDURE — 1126F AMNT PAIN NOTED NONE PRSNT: CPT | Mod: S$GLB,,, | Performed by: DERMATOLOGY

## 2021-02-17 PROCEDURE — 99203 PR OFFICE/OUTPT VISIT, NEW, LEVL III, 30-44 MIN: ICD-10-PCS | Mod: 25,S$GLB,, | Performed by: DERMATOLOGY

## 2021-02-17 PROCEDURE — 88342 IMHCHEM/IMCYTCHM 1ST ANTB: CPT | Performed by: PATHOLOGY

## 2021-02-17 PROCEDURE — 99203 OFFICE O/P NEW LOW 30 MIN: CPT | Mod: 25,S$GLB,, | Performed by: DERMATOLOGY

## 2021-02-17 PROCEDURE — 88305 TISSUE EXAM BY PATHOLOGIST: ICD-10-PCS | Mod: 26,,, | Performed by: PATHOLOGY

## 2021-02-17 PROCEDURE — 99999 PR PBB SHADOW E&M-EST. PATIENT-LVL III: CPT | Mod: PBBFAC,,, | Performed by: DERMATOLOGY

## 2021-02-17 PROCEDURE — 88342 IMHCHEM/IMCYTCHM 1ST ANTB: CPT | Mod: 26,,, | Performed by: PATHOLOGY

## 2021-02-17 PROCEDURE — 1126F PR PAIN SEVERITY QUANTIFIED, NO PAIN PRESENT: ICD-10-PCS | Mod: S$GLB,,, | Performed by: DERMATOLOGY

## 2021-02-17 PROCEDURE — 88342 CHG IMMUNOCYTOCHEMISTRY: ICD-10-PCS | Mod: 26,,, | Performed by: PATHOLOGY

## 2021-02-17 PROCEDURE — 88305 TISSUE EXAM BY PATHOLOGIST: CPT | Performed by: PATHOLOGY

## 2021-02-17 PROCEDURE — 11102 PR TANGENTIAL BIOPSY, SKIN, SINGLE LESION: ICD-10-PCS | Mod: S$GLB,,, | Performed by: DERMATOLOGY

## 2021-02-17 PROCEDURE — 11102 TANGNTL BX SKIN SINGLE LES: CPT | Mod: S$GLB,,, | Performed by: DERMATOLOGY

## 2021-02-17 PROCEDURE — 88305 TISSUE EXAM BY PATHOLOGIST: CPT | Mod: 26,,, | Performed by: PATHOLOGY

## 2021-02-22 LAB
FINAL PATHOLOGIC DIAGNOSIS: NORMAL
GROSS: NORMAL
MICROSCOPIC EXAM: NORMAL

## 2021-04-01 ENCOUNTER — PATIENT MESSAGE (OUTPATIENT)
Dept: DERMATOLOGY | Facility: CLINIC | Age: 36
End: 2021-04-01

## 2021-05-17 PROBLEM — Z00.00 WELLNESS EXAMINATION: Status: RESOLVED | Noted: 2021-02-09 | Resolved: 2021-05-17

## 2021-06-28 ENCOUNTER — OFFICE VISIT (OUTPATIENT)
Dept: FAMILY MEDICINE | Facility: CLINIC | Age: 36
End: 2021-06-28
Payer: COMMERCIAL

## 2021-06-28 VITALS
OXYGEN SATURATION: 99 % | BODY MASS INDEX: 27.6 KG/M2 | TEMPERATURE: 98 F | DIASTOLIC BLOOD PRESSURE: 84 MMHG | WEIGHT: 150 LBS | HEIGHT: 62 IN | HEART RATE: 77 BPM | RESPIRATION RATE: 16 BRPM | SYSTOLIC BLOOD PRESSURE: 110 MMHG

## 2021-06-28 DIAGNOSIS — F43.21 GRIEF REACTION: Primary | ICD-10-CM

## 2021-06-28 PROBLEM — F43.20 GRIEF REACTION: Status: ACTIVE | Noted: 2021-06-28

## 2021-06-28 PROCEDURE — 99999 PR PBB SHADOW E&M-EST. PATIENT-LVL IV: ICD-10-PCS | Mod: PBBFAC,,, | Performed by: INTERNAL MEDICINE

## 2021-06-28 PROCEDURE — 99999 PR PBB SHADOW E&M-EST. PATIENT-LVL IV: CPT | Mod: PBBFAC,,, | Performed by: INTERNAL MEDICINE

## 2021-06-28 PROCEDURE — 3008F PR BODY MASS INDEX (BMI) DOCUMENTED: ICD-10-PCS | Mod: CPTII,S$GLB,, | Performed by: INTERNAL MEDICINE

## 2021-06-28 PROCEDURE — 3008F BODY MASS INDEX DOCD: CPT | Mod: CPTII,S$GLB,, | Performed by: INTERNAL MEDICINE

## 2021-06-28 PROCEDURE — 99214 OFFICE O/P EST MOD 30 MIN: CPT | Mod: S$GLB,,, | Performed by: INTERNAL MEDICINE

## 2021-06-28 PROCEDURE — 99214 PR OFFICE/OUTPT VISIT, EST, LEVL IV, 30-39 MIN: ICD-10-PCS | Mod: S$GLB,,, | Performed by: INTERNAL MEDICINE

## 2021-06-28 PROCEDURE — 1126F AMNT PAIN NOTED NONE PRSNT: CPT | Mod: S$GLB,,, | Performed by: INTERNAL MEDICINE

## 2021-06-28 PROCEDURE — 1126F PR PAIN SEVERITY QUANTIFIED, NO PAIN PRESENT: ICD-10-PCS | Mod: S$GLB,,, | Performed by: INTERNAL MEDICINE

## 2021-06-28 RX ORDER — LORAZEPAM 0.5 MG/1
0.5 TABLET ORAL EVERY 12 HOURS PRN
Qty: 45 TABLET | Refills: 0 | Status: SHIPPED | OUTPATIENT
Start: 2021-06-28 | End: 2021-12-27

## 2021-07-15 ENCOUNTER — TELEPHONE (OUTPATIENT)
Dept: ADMINISTRATIVE | Facility: HOSPITAL | Age: 36
End: 2021-07-15

## 2021-07-15 ENCOUNTER — PATIENT OUTREACH (OUTPATIENT)
Dept: ADMINISTRATIVE | Facility: HOSPITAL | Age: 36
End: 2021-07-15

## 2021-12-27 ENCOUNTER — OFFICE VISIT (OUTPATIENT)
Dept: FAMILY MEDICINE | Facility: CLINIC | Age: 36
End: 2021-12-27
Payer: COMMERCIAL

## 2021-12-27 VITALS
HEART RATE: 80 BPM | SYSTOLIC BLOOD PRESSURE: 116 MMHG | BODY MASS INDEX: 29.25 KG/M2 | HEIGHT: 62 IN | OXYGEN SATURATION: 99 % | TEMPERATURE: 98 F | WEIGHT: 158.94 LBS | DIASTOLIC BLOOD PRESSURE: 84 MMHG

## 2021-12-27 DIAGNOSIS — M62.838 MUSCLE SPASM OF RIGHT SHOULDER: Primary | ICD-10-CM

## 2021-12-27 DIAGNOSIS — M62.838 MUSCLE SPASMS OF NECK: ICD-10-CM

## 2021-12-27 PROCEDURE — 1160F RVW MEDS BY RX/DR IN RCRD: CPT | Mod: CPTII,S$GLB,, | Performed by: STUDENT IN AN ORGANIZED HEALTH CARE EDUCATION/TRAINING PROGRAM

## 2021-12-27 PROCEDURE — 3008F PR BODY MASS INDEX (BMI) DOCUMENTED: ICD-10-PCS | Mod: CPTII,S$GLB,, | Performed by: STUDENT IN AN ORGANIZED HEALTH CARE EDUCATION/TRAINING PROGRAM

## 2021-12-27 PROCEDURE — 3008F BODY MASS INDEX DOCD: CPT | Mod: CPTII,S$GLB,, | Performed by: STUDENT IN AN ORGANIZED HEALTH CARE EDUCATION/TRAINING PROGRAM

## 2021-12-27 PROCEDURE — 3074F PR MOST RECENT SYSTOLIC BLOOD PRESSURE < 130 MM HG: ICD-10-PCS | Mod: CPTII,S$GLB,, | Performed by: STUDENT IN AN ORGANIZED HEALTH CARE EDUCATION/TRAINING PROGRAM

## 2021-12-27 PROCEDURE — 99999 PR PBB SHADOW E&M-EST. PATIENT-LVL III: ICD-10-PCS | Mod: PBBFAC,,, | Performed by: STUDENT IN AN ORGANIZED HEALTH CARE EDUCATION/TRAINING PROGRAM

## 2021-12-27 PROCEDURE — 20552 PR INJECT TRIGGER POINT, 1 OR 2: ICD-10-PCS | Mod: S$GLB,,, | Performed by: STUDENT IN AN ORGANIZED HEALTH CARE EDUCATION/TRAINING PROGRAM

## 2021-12-27 PROCEDURE — 3074F SYST BP LT 130 MM HG: CPT | Mod: CPTII,S$GLB,, | Performed by: STUDENT IN AN ORGANIZED HEALTH CARE EDUCATION/TRAINING PROGRAM

## 2021-12-27 PROCEDURE — 1160F PR REVIEW ALL MEDS BY PRESCRIBER/CLIN PHARMACIST DOCUMENTED: ICD-10-PCS | Mod: CPTII,S$GLB,, | Performed by: STUDENT IN AN ORGANIZED HEALTH CARE EDUCATION/TRAINING PROGRAM

## 2021-12-27 PROCEDURE — 20552 NJX 1/MLT TRIGGER POINT 1/2: CPT | Mod: S$GLB,,, | Performed by: STUDENT IN AN ORGANIZED HEALTH CARE EDUCATION/TRAINING PROGRAM

## 2021-12-27 PROCEDURE — 99214 PR OFFICE/OUTPT VISIT, EST, LEVL IV, 30-39 MIN: ICD-10-PCS | Mod: 25,S$GLB,, | Performed by: STUDENT IN AN ORGANIZED HEALTH CARE EDUCATION/TRAINING PROGRAM

## 2021-12-27 PROCEDURE — 1159F MED LIST DOCD IN RCRD: CPT | Mod: CPTII,S$GLB,, | Performed by: STUDENT IN AN ORGANIZED HEALTH CARE EDUCATION/TRAINING PROGRAM

## 2021-12-27 PROCEDURE — 3079F PR MOST RECENT DIASTOLIC BLOOD PRESSURE 80-89 MM HG: ICD-10-PCS | Mod: CPTII,S$GLB,, | Performed by: STUDENT IN AN ORGANIZED HEALTH CARE EDUCATION/TRAINING PROGRAM

## 2021-12-27 PROCEDURE — 1159F PR MEDICATION LIST DOCUMENTED IN MEDICAL RECORD: ICD-10-PCS | Mod: CPTII,S$GLB,, | Performed by: STUDENT IN AN ORGANIZED HEALTH CARE EDUCATION/TRAINING PROGRAM

## 2021-12-27 PROCEDURE — 3079F DIAST BP 80-89 MM HG: CPT | Mod: CPTII,S$GLB,, | Performed by: STUDENT IN AN ORGANIZED HEALTH CARE EDUCATION/TRAINING PROGRAM

## 2021-12-27 PROCEDURE — 99214 OFFICE O/P EST MOD 30 MIN: CPT | Mod: 25,S$GLB,, | Performed by: STUDENT IN AN ORGANIZED HEALTH CARE EDUCATION/TRAINING PROGRAM

## 2021-12-27 PROCEDURE — 99999 PR PBB SHADOW E&M-EST. PATIENT-LVL III: CPT | Mod: PBBFAC,,, | Performed by: STUDENT IN AN ORGANIZED HEALTH CARE EDUCATION/TRAINING PROGRAM

## 2021-12-27 RX ORDER — MELOXICAM 15 MG/1
15 TABLET ORAL DAILY
Qty: 30 TABLET | Refills: 1 | Status: SHIPPED | OUTPATIENT
Start: 2021-12-27 | End: 2022-03-29

## 2021-12-27 RX ORDER — METHOCARBAMOL 750 MG/1
TABLET, FILM COATED ORAL
Qty: 62 TABLET | Refills: 0 | Status: SHIPPED | OUTPATIENT
Start: 2021-12-27 | End: 2022-01-10

## 2021-12-27 RX ORDER — LIDOCAINE HYDROCHLORIDE 10 MG/ML
2 INJECTION INFILTRATION; PERINEURAL
Status: DISCONTINUED | OUTPATIENT
Start: 2021-12-27 | End: 2021-12-27

## 2021-12-27 RX ORDER — LIDOCAINE HYDROCHLORIDE 10 MG/ML
2 INJECTION INFILTRATION; PERINEURAL
Status: DISCONTINUED | OUTPATIENT
Start: 2021-12-27 | End: 2022-02-08

## 2021-12-27 RX ORDER — PHENYLPROPANOLAMINE/CLEMASTINE 75-1.34MG
TABLET, EXTENDED RELEASE ORAL
COMMUNITY
Start: 2021-12-23 | End: 2021-12-27 | Stop reason: ALTCHOICE

## 2021-12-27 RX ORDER — MENTHOL AND METHYL SALICYLATE 7.6; 29 G/100G; G/100G
OINTMENT TOPICAL
COMMUNITY
Start: 2021-12-23 | End: 2023-10-24

## 2021-12-31 ENCOUNTER — PATIENT MESSAGE (OUTPATIENT)
Dept: FAMILY MEDICINE | Facility: CLINIC | Age: 36
End: 2021-12-31
Payer: COMMERCIAL

## 2022-01-11 ENCOUNTER — PATIENT MESSAGE (OUTPATIENT)
Dept: FAMILY MEDICINE | Facility: CLINIC | Age: 37
End: 2022-01-11

## 2022-01-11 ENCOUNTER — OFFICE VISIT (OUTPATIENT)
Dept: FAMILY MEDICINE | Facility: CLINIC | Age: 37
End: 2022-01-11
Payer: COMMERCIAL

## 2022-01-11 VITALS
BODY MASS INDEX: 29.86 KG/M2 | SYSTOLIC BLOOD PRESSURE: 124 MMHG | OXYGEN SATURATION: 99 % | TEMPERATURE: 98 F | DIASTOLIC BLOOD PRESSURE: 86 MMHG | WEIGHT: 162.25 LBS | HEIGHT: 62 IN | HEART RATE: 69 BPM

## 2022-01-11 DIAGNOSIS — M62.838 MUSCLE SPASM OF RIGHT SHOULDER: Primary | ICD-10-CM

## 2022-01-11 PROCEDURE — 99999 PR PBB SHADOW E&M-EST. PATIENT-LVL IV: ICD-10-PCS | Mod: PBBFAC,,, | Performed by: STUDENT IN AN ORGANIZED HEALTH CARE EDUCATION/TRAINING PROGRAM

## 2022-01-11 PROCEDURE — 1160F RVW MEDS BY RX/DR IN RCRD: CPT | Mod: CPTII,S$GLB,, | Performed by: STUDENT IN AN ORGANIZED HEALTH CARE EDUCATION/TRAINING PROGRAM

## 2022-01-11 PROCEDURE — 99214 OFFICE O/P EST MOD 30 MIN: CPT | Mod: S$GLB,,, | Performed by: STUDENT IN AN ORGANIZED HEALTH CARE EDUCATION/TRAINING PROGRAM

## 2022-01-11 PROCEDURE — 3079F DIAST BP 80-89 MM HG: CPT | Mod: CPTII,S$GLB,, | Performed by: STUDENT IN AN ORGANIZED HEALTH CARE EDUCATION/TRAINING PROGRAM

## 2022-01-11 PROCEDURE — 99214 PR OFFICE/OUTPT VISIT, EST, LEVL IV, 30-39 MIN: ICD-10-PCS | Mod: S$GLB,,, | Performed by: STUDENT IN AN ORGANIZED HEALTH CARE EDUCATION/TRAINING PROGRAM

## 2022-01-11 PROCEDURE — 1159F PR MEDICATION LIST DOCUMENTED IN MEDICAL RECORD: ICD-10-PCS | Mod: CPTII,S$GLB,, | Performed by: STUDENT IN AN ORGANIZED HEALTH CARE EDUCATION/TRAINING PROGRAM

## 2022-01-11 PROCEDURE — 3008F BODY MASS INDEX DOCD: CPT | Mod: CPTII,S$GLB,, | Performed by: STUDENT IN AN ORGANIZED HEALTH CARE EDUCATION/TRAINING PROGRAM

## 2022-01-11 PROCEDURE — 3008F PR BODY MASS INDEX (BMI) DOCUMENTED: ICD-10-PCS | Mod: CPTII,S$GLB,, | Performed by: STUDENT IN AN ORGANIZED HEALTH CARE EDUCATION/TRAINING PROGRAM

## 2022-01-11 PROCEDURE — 3074F PR MOST RECENT SYSTOLIC BLOOD PRESSURE < 130 MM HG: ICD-10-PCS | Mod: CPTII,S$GLB,, | Performed by: STUDENT IN AN ORGANIZED HEALTH CARE EDUCATION/TRAINING PROGRAM

## 2022-01-11 PROCEDURE — 1159F MED LIST DOCD IN RCRD: CPT | Mod: CPTII,S$GLB,, | Performed by: STUDENT IN AN ORGANIZED HEALTH CARE EDUCATION/TRAINING PROGRAM

## 2022-01-11 PROCEDURE — 99999 PR PBB SHADOW E&M-EST. PATIENT-LVL IV: CPT | Mod: PBBFAC,,, | Performed by: STUDENT IN AN ORGANIZED HEALTH CARE EDUCATION/TRAINING PROGRAM

## 2022-01-11 PROCEDURE — 3079F PR MOST RECENT DIASTOLIC BLOOD PRESSURE 80-89 MM HG: ICD-10-PCS | Mod: CPTII,S$GLB,, | Performed by: STUDENT IN AN ORGANIZED HEALTH CARE EDUCATION/TRAINING PROGRAM

## 2022-01-11 PROCEDURE — 3074F SYST BP LT 130 MM HG: CPT | Mod: CPTII,S$GLB,, | Performed by: STUDENT IN AN ORGANIZED HEALTH CARE EDUCATION/TRAINING PROGRAM

## 2022-01-11 PROCEDURE — 1160F PR REVIEW ALL MEDS BY PRESCRIBER/CLIN PHARMACIST DOCUMENTED: ICD-10-PCS | Mod: CPTII,S$GLB,, | Performed by: STUDENT IN AN ORGANIZED HEALTH CARE EDUCATION/TRAINING PROGRAM

## 2022-01-11 RX ORDER — METHOCARBAMOL 750 MG/1
750 TABLET, FILM COATED ORAL 3 TIMES DAILY
Qty: 30 TABLET | Refills: 0 | Status: SHIPPED | OUTPATIENT
Start: 2022-01-11 | End: 2022-01-21

## 2022-01-11 NOTE — PROGRESS NOTES
Subjective:      Patient ID: Shelby Anderson is a 36 y.o. female.    Chief Complaint: Follow-up (Pt here for 2 week follow up for trigger point)    - 2 week f/u right shoulder/neck pain with trigger point that was injected   - much better than was but still not 100%  - notes the lump in the shoulder is getting smaller and less tender  - boyfriend massages shoulder to help loosen it up and that helps but still is painful   - taking muscle relaxer and mobic   - had to take time off work due to the limitations of no lifting above 5lbs   - has been trying to do the stretches but some are still painful to do  - open to trying PT especially since off work until heals     Review of Systems   Constitutional: Negative for fatigue and fever.   Musculoskeletal: Positive for myalgias, neck pain and neck stiffness.   Neurological: Negative for dizziness, weakness and numbness.   Psychiatric/Behavioral: Positive for sleep disturbance.        Objective:     Vitals:    01/11/22 0846   BP: 124/86   Pulse: 69   Temp: 98.1 °F (36.7 °C)      Physical Exam  Vitals reviewed.   Constitutional:       Appearance: Normal appearance.   HENT:      Head: Atraumatic.   Eyes:      Conjunctiva/sclera: Conjunctivae normal.   Pulmonary:      Effort: Pulmonary effort is normal.   Musculoskeletal:      Right shoulder: Tenderness present. No bony tenderness. Decreased range of motion.      Comments: right shoulder much less spasm and less tender   Trigger point resolved    Neurological:      General: No focal deficit present.      Mental Status: She is alert and oriented to person, place, and time.   Psychiatric:         Mood and Affect: Mood normal.         Behavior: Behavior normal.        Assessment:         1. Muscle spasm of right shoulder          Plan:   1. Muscle spasm of right shoulder  - methocarbamoL (ROBAXIN) 750 MG Tab; Take 1 tablet (750 mg total) by mouth 3 (three) times daily. for 10 days  Dispense: 30 tablet; Refill: 0  -  Ambulatory referral/consult to Physical/Occupational Therapy; Future       Continue mobic daily; BID-TID robaxin; Start PT (modalities, dry needling, myofascial release)  Unable to complete job with 5lbs limit so will remain out until next visit; she will drop off disability and FMLA papers to be completed   RTC in 2 weeks         [facility]   1/11/22

## 2022-01-24 ENCOUNTER — CLINICAL SUPPORT (OUTPATIENT)
Dept: REHABILITATION | Facility: HOSPITAL | Age: 37
End: 2022-01-24
Payer: COMMERCIAL

## 2022-01-24 DIAGNOSIS — M62.838 MUSCLE SPASM OF RIGHT SHOULDER: ICD-10-CM

## 2022-01-24 DIAGNOSIS — R52 PAIN: ICD-10-CM

## 2022-01-24 PROCEDURE — 97140 MANUAL THERAPY 1/> REGIONS: CPT | Performed by: PHYSICAL THERAPIST

## 2022-01-24 PROCEDURE — 97110 THERAPEUTIC EXERCISES: CPT | Performed by: PHYSICAL THERAPIST

## 2022-01-24 PROCEDURE — 97161 PT EVAL LOW COMPLEX 20 MIN: CPT | Performed by: PHYSICAL THERAPIST

## 2022-01-24 NOTE — PLAN OF CARE
[facility]  Physical Therapy Initial Evaluation    Name: Shelby Anderson  Clinic Number: [id number]    Therapy Diagnosis:   Encounter Diagnoses   Name Primary?    Muscle spasm of right shoulder     Pain      Physician: Cami Wilkins DO    Physician Orders: PT Eval and Treat   Medical Diagnosis: M62.838 (ICD-10-CM) - Muscle spasm of right shoulder   Evaluation Date: 1/24/2022  Authorization Period Expiration: 12-31-22  Plan of Care Certification Period: 4-30-22  Visit # / Visits authorized: 1/ 1    Time In: 9:00 am  Time Out: 9:50 am  Total Billable Time: 40 minutes    Precautions: Standard    Subjective     Date of onset: 4 wks ago  History of current condition - Shelby reports: she began having pain of insidious onset in R upper trap and scap area 4 wks ago.  She states sx decreased after a pain injection into UT.  Pt still c/o pain/tightness in R UT/rhomboids.  Pt denies UE radicular sx or shld joint pain.  Pt feels pain is limiting her ADL.  States presently out of work due to pain.       No past medical history on file.  Shelby Anderson  has no past surgical history on file.    Shelby has a current medication list which includes the following prescription(s): meloxicam and icy hot, and the following Facility-Administered Medications: lidocaine hcl 10 mg/ml (1%).    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  Prior Level of Function: jogging; wt training  Current Level of Function: ADL limited by pain    Pain:  Current 5/10, worst 7/10, best 2/10   Location: right UT/scapula  Description: Aching and Tight  Aggravating Factors: neck extension and Lifting  Easing Factors: heating pad and rest    Pts goals: pain-free RTW    Objective     Postural examination:  FHP with slumped shld     Functional assessment:   - walking:    "independent             AROM:  WNL C-spine, pain with ext and L rot; WNL B UE     MMT:   5/5 B UE and 4+/5 neck muscles; 4+/5 rhomboids    Tone:  Normal upper quarter    Flexibility testing:   Tight R UT    Special tests:   Neg C-compression and Spurling's    Palpation:   TTP  R UT and rhomboids    Joint mobility: fair C-sp; normal GHJ    Swelling:  na    Other:  Sensation intact to light touch    CMS Impairment/Limitation/Restriction for FOTO shoulder Survey    Therapist reviewed FOTO scores for Shelby Anderson on 1/24/2022.   FOTO documents entered into [employer] - see Media section.           TREATMENT     Treatment Time In: 9:00 am  Treatment Time Out: 9:50 am  Total Treatment time separate from Evaluation time: 35 min    Treatment:    HP x 10 min  HEP (scap retractions, chin tucks and UT stretching)  Manual SO release and Ctxn x 8 min  Dry needling R UT with 2 1" needles and rhomboids with 2 1" needles after written consent given  Posture education     Home Exercises and Patient Education Provided    Education provided:   - posture education    Written Home Exercises Provided: yes.  Exercises were reviewed and Shelby was able to demonstrate them prior to the end of the session.  Shelby demonstrated good  understanding of the education provided.     See EMR under Patient Instructions for exercises provided 1/24/2022.      Assessment     Shelby is a 37 y.o. female referred to outpatient Physical Therapy with a medical diagnosis of M62.838 (ICD-10-CM) - Muscle spasm of right shoulder   .  Pt presents with R upper trap and rhomboid pain, tightness and poor posture.  Tolerated dry needling well.    Pt prognosis is Good.   Pt will benefit from skilled outpatient Physical Therapy to address the deficits stated above and in the chart below, provide pt/family education, and to maximize pt's level of independence.     Plan of care discussed with patient: Yes  Pt's spiritual, cultural and educational needs considered and " patient is agreeable to the plan of care and goals as stated below:     Anticipated Barriers for therapy: none    Medical Necessity is demonstrated by the following:  History  Co-morbidities and personal factors that may impact the plan of care Co-morbidities:   none    Personal Factors:   no deficits     low   Examination  Body Structures and Functions, activity limitations and participation restrictions that may impact the plan of care Body Regions:   neck  upper extremities    Body Systems:    gross symmetry  ROM  strength    Participation Restrictions:   none    Activity limitations:   Learning and applying knowledge  no deficits    General Tasks and Commands  no deficits    Communication  no deficits    Mobility  lifting and carrying objects    Self care  no deficits    Domestic Life  doing house work (cleaning house, washing dishes, laundry)    Interactions/Relationships  no deficits    Life Areas  no deficits    Community and Social Life  no deficits         low   Clinical Presentation stable and uncomplicated low   Decision Making/ Complexity Score: low     Goals:  Short Term Goals: 2 weeks         1.   Independent with HEP        2.  Pt will report decreased pain level of < 50% from above measure with ADL    Long Term Goals:   GOALS:    8_   weeks. Pt agrees with goals set.  1. Independent with HEP.  2. Report decreased    Neck/scap    pain  <   / =  2/10 with ADL such as dressing  3. Increased MMT  for  Neck/upper back to 5/5  with ADL such as lifting > 10#  4. Increased flexibility  for  Neck/UT to WFL-WNL with functional activities such house work or self-care      Plan     Certification Period/Plan of care expiration: 1/24/2022 to 4-30-22.    Outpatient Physical Therapy 2 times weekly for 8 weeks to include the following interventions: Manual Therapy, Moist Heat/ Ice, Patient Education and Therapeutic Exercise and dry needling.     Black Leavitt, PT

## 2022-01-26 ENCOUNTER — CLINICAL SUPPORT (OUTPATIENT)
Dept: REHABILITATION | Facility: HOSPITAL | Age: 37
End: 2022-01-26
Payer: COMMERCIAL

## 2022-01-26 DIAGNOSIS — R52 PAIN: ICD-10-CM

## 2022-01-26 PROCEDURE — 97110 THERAPEUTIC EXERCISES: CPT | Performed by: PHYSICAL THERAPIST

## 2022-01-26 PROCEDURE — 97140 MANUAL THERAPY 1/> REGIONS: CPT | Performed by: PHYSICAL THERAPIST

## 2022-01-26 NOTE — PROGRESS NOTES
"  Physical Therapy Daily Treatment Note     Name: Shelby Anderson  Clinic Number: [id number]    Therapy Diagnosis:   Encounter Diagnosis   Name Primary?    Pain      Physician: Cami Wilkins DO    Visit Date: 1/26/2022  Physician Orders: PT Eval and Treat   Medical Diagnosis: M62.838 (ICD-10-CM) - Muscle spasm of right shoulder   Evaluation Date: 1/24/2022  Authorization Period Expiration: 12-31-22  Plan of Care Certification Period: 4-30-22  Visit # / Visits authorized: 2/ 20    Time In: 9:50 am  Time Out: 10:35 am  Total Billable Time: 35 minutes    Precautions: Standard    Subjective     Pt reports: neck/shld R feel about the same.  She was compliant with home exercise program.  Response to previous treatment: na  Functional change: na    Pain: 4/10  Location: right neck  and shoulder      Objective     TTP R UT and rhomboids.    Shelby received therapeutic exercises to develop strength, ROM and flexibility for 35 minutes including:  HP x 10 min  Manual SO release x 8 min  Manual Ctxn x 5 min  Breuger's with OTB 3 x 10  OTB HAB 3 x 10  Dry needling R UT and rhomboids with 3 1" needles    Home Exercises Provided and Patient Education Provided     Education provided:   - ice for pain    Written Home Exercises Provided: Patient instructed to cont prior HEP.  Exercises were reviewed and Shelby was able to demonstrate them prior to the end of the session.  Shelby demonstrated good  understanding of the education provided.     See EMR under Patient Instructions for exercises provided prior visit.    Assessment     Mild pain with DN R UT, thus only 1 needle used there.  No pain with exercise or manual techniques.  Shelby Is progressing well towards her goals.   Pt prognosis is Good.     Pt will continue to benefit from skilled outpatient physical therapy to address the deficits listed in the problem list box on initial evaluation, provide pt/family education and to maximize pt's level of independence in the " home and community environment.     Pt's spiritual, cultural and educational needs considered and pt agreeable to plan of care and goals.    Anticipated barriers to physical therapy: none    Goals: Short Term Goals: 2 weeks         1.   Independent with HEP        2.  Pt will report decreased pain level of < 50% from above measure with ADL     Long Term Goals:   GOALS:    8_   weeks. Pt agrees with goals set.  1. Independent with HEP.  2. Report decreased    Neck/scap    pain  <   / =  2/10 with ADL such as dressing  3. Increased MMT  for  Neck/upper back to 5/5  with ADL such as lifting > 10#  4. Increased flexibility  for  Neck/UT to WFL-WNL with functional activities such house work or self-care      Plan     Continue PT per POC.    Black Leavitt, PT

## 2022-02-02 ENCOUNTER — CLINICAL SUPPORT (OUTPATIENT)
Dept: REHABILITATION | Facility: HOSPITAL | Age: 37
End: 2022-02-02
Payer: COMMERCIAL

## 2022-02-02 DIAGNOSIS — R52 PAIN: ICD-10-CM

## 2022-02-02 PROCEDURE — 97140 MANUAL THERAPY 1/> REGIONS: CPT | Performed by: PHYSICAL THERAPIST

## 2022-02-02 PROCEDURE — 97110 THERAPEUTIC EXERCISES: CPT | Performed by: PHYSICAL THERAPIST

## 2022-02-02 NOTE — PROGRESS NOTES
"  Physical Therapy Daily Treatment Note     Name: Shelby Anderson  Clinic Number: [id number]    Therapy Diagnosis:   Encounter Diagnosis   Name Primary?    Pain      Physician: Cami Wilkins DO    Visit Date: 2/2/2022  Physician Orders: PT Eval and Treat   Medical Diagnosis: M62.838 (ICD-10-CM) - Muscle spasm of right shoulder   Evaluation Date: 1/24/2022  Authorization Period Expiration: 12-31-22  Plan of Care Certification Period: 4-30-22  Visit # / Visits authorized: 3/ 20    Time In: 9:45 am  Time Out: 10:35 am  Total Billable Time: 35 minutes    Precautions: Standard    Subjective     Pt reports: pain along vertebral border of scapula and tightness R UT .  She was compliant with home exercise program.  Response to previous treatment: na  Functional change: na    Pain: 3/10  Location: right neck  and shoulder      Objective     TTP R UT and rhomboids.    Shelby received therapeutic exercises to develop strength, ROM and flexibility for 35 minutes including:  HP x 10 min  Manual SO release x 8 min  Manual Ctxn/PROM x 5 min  Breuger's with OTB 2 x 10  OTB HAB 2 x 10  Dry needling R UT (2 1" needles) and rhomboids with 3 1/2" needles    Home Exercises Provided and Patient Education Provided     Education provided:   - ice for pain    Written Home Exercises Provided: Patient instructed to cont prior HEP.  Exercises were reviewed and Shelby was able to demonstrate them prior to the end of the session.  Shelby demonstrated good  understanding of the education provided.     See EMR under Patient Instructions for exercises provided prior visit.    Assessment     Tolerated treatment well including DN.  Shelby Is progressing well towards her goals.   Pt prognosis is Good.     Pt will continue to benefit from skilled outpatient physical therapy to address the deficits listed in the problem list box on initial evaluation, provide pt/family education and to maximize pt's level of independence in the home and " community environment.     Pt's spiritual, cultural and educational needs considered and pt agreeable to plan of care and goals.    Anticipated barriers to physical therapy: none    Goals: Short Term Goals: 2 weeks         1.   Independent with HEP        2.  Pt will report decreased pain level of < 50% from above measure with ADL     Long Term Goals:   GOALS:    8_   weeks. Pt agrees with goals set.  1. Independent with HEP.  2. Report decreased    Neck/scap    pain  <   / =  2/10 with ADL such as dressing  3. Increased MMT  for  Neck/upper back to 5/5  with ADL such as lifting > 10#  4. Increased flexibility  for  Neck/UT to WFL-WNL with functional activities such house work or self-care      Plan     Continue PT per POC.    Black Leavitt, PT

## 2022-02-04 ENCOUNTER — CLINICAL SUPPORT (OUTPATIENT)
Dept: REHABILITATION | Facility: HOSPITAL | Age: 37
End: 2022-02-04
Payer: MEDICAID

## 2022-02-04 DIAGNOSIS — R52 PAIN: ICD-10-CM

## 2022-02-04 PROCEDURE — 97140 MANUAL THERAPY 1/> REGIONS: CPT | Performed by: PHYSICAL THERAPIST

## 2022-02-04 PROCEDURE — 97110 THERAPEUTIC EXERCISES: CPT | Performed by: PHYSICAL THERAPIST

## 2022-02-04 NOTE — PROGRESS NOTES
"  Physical Therapy Daily Treatment Note     Name: Shelby Anderson  Clinic Number: [id number]    Therapy Diagnosis:   Encounter Diagnosis   Name Primary?    Pain      Physician: Cami Wilkins DO    Visit Date: 2/4/2022  Physician Orders: PT Eval and Treat   Medical Diagnosis: M62.838 (ICD-10-CM) - Muscle spasm of right shoulder   Evaluation Date: 1/24/2022  Authorization Period Expiration: 12-31-22  Plan of Care Certification Period: 4-30-22  Visit # / Visits authorized: 4/ 20    Time In: 9:00 am  Time Out: 9:50 am  Total Billable Time: 40 minutes    Precautions: Standard    Subjective     Pt reports: shl/scap feel the same.  She was compliant with home exercise program.  Response to previous treatment: na  Functional change: na    Pain: 3/10  Location: right neck  and shoulder      Objective     TTP R UT and rhomboids.  Needs vc for correct posture.    Shelby received therapeutic exercises to develop strength, ROM and flexibility for 35 minutes including:  HP x 10 min  Manual SO release x 8 min  Manual Ctxn/PROM x 5 min  Cupping scap border and UT x 8 min  Dry needling R UT (2 1" needles) and rhomboids with 3 1/2" needles    Home Exercises Provided and Patient Education Provided     Education provided:   - ice for pain    Written Home Exercises Provided: Patient instructed to cont prior HEP.  Exercises were reviewed and Shelby was able to demonstrate them prior to the end of the session.  Shelby demonstrated good  understanding of the education provided.     See EMR under Patient Instructions for exercises provided prior visit.    Assessment     Tolerated treatment well including DN.  Sx decreased after treatment.  Shelby Is progressing well towards her goals.   Pt prognosis is Good.     Pt will continue to benefit from skilled outpatient physical therapy to address the deficits listed in the problem list box on initial evaluation, provide pt/family education and to maximize pt's level of independence in " the home and community environment.     Pt's spiritual, cultural and educational needs considered and pt agreeable to plan of care and goals.    Anticipated barriers to physical therapy: none    Goals: Short Term Goals: 2 weeks         1.   Independent with HEP        2.  Pt will report decreased pain level of < 50% from above measure with ADL     Long Term Goals:   GOALS:    8_   weeks. Pt agrees with goals set.  1. Independent with HEP.  2. Report decreased    Neck/scap    pain  <   / =  2/10 with ADL such as dressing  3. Increased MMT  for  Neck/upper back to 5/5  with ADL such as lifting > 10#  4. Increased flexibility  for  Neck/UT to WFL-WNL with functional activities such house work or self-care      Plan     Continue PT per POC.    Black Leavitt, PT

## 2022-02-08 ENCOUNTER — CLINICAL SUPPORT (OUTPATIENT)
Dept: REHABILITATION | Facility: HOSPITAL | Age: 37
End: 2022-02-08
Payer: COMMERCIAL

## 2022-02-08 ENCOUNTER — OFFICE VISIT (OUTPATIENT)
Dept: FAMILY MEDICINE | Facility: CLINIC | Age: 37
End: 2022-02-08
Payer: COMMERCIAL

## 2022-02-08 VITALS
DIASTOLIC BLOOD PRESSURE: 88 MMHG | HEIGHT: 62 IN | WEIGHT: 152.25 LBS | TEMPERATURE: 98 F | OXYGEN SATURATION: 99 % | HEART RATE: 81 BPM | BODY MASS INDEX: 28.02 KG/M2 | SYSTOLIC BLOOD PRESSURE: 124 MMHG

## 2022-02-08 DIAGNOSIS — R52 PAIN: ICD-10-CM

## 2022-02-08 DIAGNOSIS — M62.838 MUSCLE SPASM OF RIGHT SHOULDER: Primary | ICD-10-CM

## 2022-02-08 PROBLEM — L98.9 SKIN LESIONS: Status: RESOLVED | Noted: 2021-02-09 | Resolved: 2022-02-08

## 2022-02-08 PROBLEM — G56.00 CARPAL TUNNEL SYNDROME: Status: RESOLVED | Noted: 2021-02-09 | Resolved: 2022-02-08

## 2022-02-08 PROBLEM — F43.20 GRIEF REACTION: Status: RESOLVED | Noted: 2021-06-28 | Resolved: 2022-02-08

## 2022-02-08 PROBLEM — M79.671 RIGHT FOOT PAIN: Status: RESOLVED | Noted: 2021-02-09 | Resolved: 2022-02-08

## 2022-02-08 PROBLEM — Z11.59 ENCOUNTER FOR HEPATITIS C SCREENING TEST FOR LOW RISK PATIENT: Status: RESOLVED | Noted: 2021-02-09 | Resolved: 2022-02-08

## 2022-02-08 PROBLEM — F43.21 GRIEF REACTION: Status: RESOLVED | Noted: 2021-06-28 | Resolved: 2022-02-08

## 2022-02-08 PROCEDURE — 3008F PR BODY MASS INDEX (BMI) DOCUMENTED: ICD-10-PCS | Mod: CPTII,S$GLB,, | Performed by: STUDENT IN AN ORGANIZED HEALTH CARE EDUCATION/TRAINING PROGRAM

## 2022-02-08 PROCEDURE — 3074F SYST BP LT 130 MM HG: CPT | Mod: CPTII,S$GLB,, | Performed by: STUDENT IN AN ORGANIZED HEALTH CARE EDUCATION/TRAINING PROGRAM

## 2022-02-08 PROCEDURE — 99213 PR OFFICE/OUTPT VISIT, EST, LEVL III, 20-29 MIN: ICD-10-PCS | Mod: S$GLB,,, | Performed by: STUDENT IN AN ORGANIZED HEALTH CARE EDUCATION/TRAINING PROGRAM

## 2022-02-08 PROCEDURE — 3079F DIAST BP 80-89 MM HG: CPT | Mod: CPTII,S$GLB,, | Performed by: STUDENT IN AN ORGANIZED HEALTH CARE EDUCATION/TRAINING PROGRAM

## 2022-02-08 PROCEDURE — 3074F PR MOST RECENT SYSTOLIC BLOOD PRESSURE < 130 MM HG: ICD-10-PCS | Mod: CPTII,S$GLB,, | Performed by: STUDENT IN AN ORGANIZED HEALTH CARE EDUCATION/TRAINING PROGRAM

## 2022-02-08 PROCEDURE — 99999 PR PBB SHADOW E&M-EST. PATIENT-LVL IV: ICD-10-PCS | Mod: PBBFAC,,, | Performed by: STUDENT IN AN ORGANIZED HEALTH CARE EDUCATION/TRAINING PROGRAM

## 2022-02-08 PROCEDURE — 99999 PR PBB SHADOW E&M-EST. PATIENT-LVL IV: CPT | Mod: PBBFAC,,, | Performed by: STUDENT IN AN ORGANIZED HEALTH CARE EDUCATION/TRAINING PROGRAM

## 2022-02-08 PROCEDURE — 99213 OFFICE O/P EST LOW 20 MIN: CPT | Mod: S$GLB,,, | Performed by: STUDENT IN AN ORGANIZED HEALTH CARE EDUCATION/TRAINING PROGRAM

## 2022-02-08 PROCEDURE — 1160F PR REVIEW ALL MEDS BY PRESCRIBER/CLIN PHARMACIST DOCUMENTED: ICD-10-PCS | Mod: CPTII,S$GLB,, | Performed by: STUDENT IN AN ORGANIZED HEALTH CARE EDUCATION/TRAINING PROGRAM

## 2022-02-08 PROCEDURE — 1159F MED LIST DOCD IN RCRD: CPT | Mod: CPTII,S$GLB,, | Performed by: STUDENT IN AN ORGANIZED HEALTH CARE EDUCATION/TRAINING PROGRAM

## 2022-02-08 PROCEDURE — 3079F PR MOST RECENT DIASTOLIC BLOOD PRESSURE 80-89 MM HG: ICD-10-PCS | Mod: CPTII,S$GLB,, | Performed by: STUDENT IN AN ORGANIZED HEALTH CARE EDUCATION/TRAINING PROGRAM

## 2022-02-08 PROCEDURE — 97140 MANUAL THERAPY 1/> REGIONS: CPT | Performed by: PHYSICAL THERAPIST

## 2022-02-08 PROCEDURE — 1159F PR MEDICATION LIST DOCUMENTED IN MEDICAL RECORD: ICD-10-PCS | Mod: CPTII,S$GLB,, | Performed by: STUDENT IN AN ORGANIZED HEALTH CARE EDUCATION/TRAINING PROGRAM

## 2022-02-08 PROCEDURE — 97110 THERAPEUTIC EXERCISES: CPT | Performed by: PHYSICAL THERAPIST

## 2022-02-08 PROCEDURE — 1160F RVW MEDS BY RX/DR IN RCRD: CPT | Mod: CPTII,S$GLB,, | Performed by: STUDENT IN AN ORGANIZED HEALTH CARE EDUCATION/TRAINING PROGRAM

## 2022-02-08 PROCEDURE — 3008F BODY MASS INDEX DOCD: CPT | Mod: CPTII,S$GLB,, | Performed by: STUDENT IN AN ORGANIZED HEALTH CARE EDUCATION/TRAINING PROGRAM

## 2022-02-08 RX ORDER — METHOCARBAMOL 500 MG/1
500 TABLET, FILM COATED ORAL 4 TIMES DAILY
COMMUNITY
End: 2022-02-28 | Stop reason: SDUPTHER

## 2022-02-08 NOTE — PROGRESS NOTES
"  Physical Therapy Daily Treatment Note     Name: Shelby Anderson  Clinic Number: [id number]    Therapy Diagnosis:   Encounter Diagnosis   Name Primary?    Pain      Physician: Cami Wilkins DO    Visit Date: 2/8/2022  Physician Orders: PT Eval and Treat   Medical Diagnosis: M62.838 (ICD-10-CM) - Muscle spasm of right shoulder   Evaluation Date: 1/24/2022  Authorization Period Expiration: 12-31-22  Plan of Care Certification Period: 4-30-22  Visit # / Visits authorized: 5/ 20    Time In: 10:00 am  Time Out: 9:50 am  Total Billable Time: 40 minutes    Precautions: Standard    Subjective     Pt reports: R shld/scap feel better overall but still a little sore.  States she'll RTW tomorrow thus no more PT.  She was compliant with home exercise program.  Response to previous treatment: less pain  Functional change: improved ADL    Pain: 1/10  Location: right neck  and shoulder      Objective     MIN TTP R UT and rhomboids.  Needs vc for correct posture. Gross strength 5/5. C-AROM is WNL.    Shelby received therapeutic exercises to develop strength, ROM and flexibility for 40 minutes including:  HP x 10 min  Manual SO release x 8 min  Manual Ctxn/PROM x 5 min  Cupping scap border and UT x 8 min  OTB Breuger's  OTB HABD  Dry needling R UT (2 1" needles) and rhomboids with 1 1" needle    Home Exercises Provided and Patient Education Provided     Education provided:   - ice for pain    Written Home Exercises Provided: Patient instructed to cont prior HEP.  Exercises were reviewed and Shelby was able to demonstrate them prior to the end of the session.  Shelby demonstrated good  understanding of the education provided.     See EMR under Patient Instructions for exercises provided prior visit.    Assessment     Tolerated treatment well including DN.  Sx decreased overall.  Shelby Is progressing well towards her goals.   Pt prognosis is Good.     Pt will continue to benefit from skilled outpatient physical therapy to " address the deficits listed in the problem list box on initial evaluation, provide pt/family education and to maximize pt's level of independence in the home and community environment.     Pt's spiritual, cultural and educational needs considered and pt agreeable to plan of care and goals.    Anticipated barriers to physical therapy: none    Goals: Short Term Goals: 2 weeks         1.   Independent with HEP        2.  Pt will report decreased pain level of < 50% from above measure with ADL     Long Term Goals:   GOALS:    8_   weeks. Pt agrees with goals set.  1. Independent with HEP.  2. Report decreased    Neck/scap    pain  <   / =  2/10 with ADL such as dressing  MET  3. Increased MMT  for  Neck/upper back to 5/5  with ADL such as lifting > 10#  MET  4. Increased flexibility  for  Neck/UT to WFL-WNL with functional activities such house work or self-care  MET      Plan     D/C PT with a HEP secondary pt RTW.  MET all goals.    Black Leavitt, PT

## 2022-02-08 NOTE — PROGRESS NOTES
Subjective:      Patient ID: Shelby Anderson is a 37 y.o. female.    Chief Complaint: Follow-up (Pt here for follow up for her right shoulder )    - f/u right shoulder pain  - pain is improved overall; still taking mobic daily and robaxin at night; wants to try and wean off robaxin if can   - going to PT; was having pain with exercises; doing more modalities now including cupping and that seems to help the most  - thinks is ready to go back to work  - lifting is necessary but she thinks she can manage and use more legs to help rather than arms   - heat also helps  - no new complaints; needs note to return to work   - will likely stop PT if returns to work bc will not have time     Review of Systems   Constitutional: Negative for fever.   HENT: Negative.    Respiratory: Negative for cough, shortness of breath and wheezing.    Cardiovascular: Negative for chest pain and leg swelling.   Gastrointestinal: Negative for abdominal pain, diarrhea, nausea and vomiting.   Genitourinary: Negative.  Negative for difficulty urinating, dysuria and frequency.   Musculoskeletal: Positive for arthralgias and myalgias.   Neurological: Negative.  Negative for dizziness, numbness and headaches.   Psychiatric/Behavioral: Negative for dysphoric mood. The patient is not nervous/anxious.         Objective:     Vitals:    02/08/22 0900   BP: 124/88   Pulse: 81   Temp: 98.1 °F (36.7 °C)      Physical Exam  Vitals reviewed.   Constitutional:       Appearance: Normal appearance. She is overweight.   HENT:      Head: Atraumatic.   Eyes:      Conjunctiva/sclera: Conjunctivae normal.   Pulmonary:      Effort: Pulmonary effort is normal.   Musculoskeletal:      Right shoulder: No tenderness. Normal strength.      Left shoulder: Normal strength.      Comments: Spasm and tenderness much improved  Bruising from cupping seen 2 spots on the right shoulder    Neurological:      General: No focal deficit present.      Mental Status: She is alert and  oriented to person, place, and time.   Psychiatric:         Mood and Affect: Mood normal.         Behavior: Behavior normal.        Assessment:         1. Muscle spasm of right shoulder          Plan:   1. Muscle spasm of right shoulder     Cont prn mobic and robaxin; ok to back off to only when needed rather than daily; Tylenol ok prn for pain as well  Cont home stretches, heat, cupping? If can   Use legs to lift when return to work  Ok to RTW full duty starting tomorrow   RTC prn          Cami Wilkins   [facility]   2/8/22

## 2022-02-08 NOTE — LETTER
February 8, 2022    Shelby Anderson  [address] [place] [address]         [address] [address]  Phone: [phone]  Fax: [phone] February 8, 2022     Patient: Shelby Anderson   YOB: 1985   Date of Visit: 2/8/2022       To Whom It May Concern:    It is my medical opinion that Shelby Anderson may return to full duty immediately with no restrictions.    If you have any questions or concerns, please don't hesitate to call.    Sincerely,        Cami Wilkins, DO

## 2022-02-28 ENCOUNTER — OFFICE VISIT (OUTPATIENT)
Dept: FAMILY MEDICINE | Facility: CLINIC | Age: 37
End: 2022-02-28
Payer: COMMERCIAL

## 2022-02-28 VITALS
DIASTOLIC BLOOD PRESSURE: 88 MMHG | TEMPERATURE: 98 F | HEIGHT: 62 IN | SYSTOLIC BLOOD PRESSURE: 128 MMHG | OXYGEN SATURATION: 100 % | HEART RATE: 91 BPM | BODY MASS INDEX: 29.62 KG/M2 | WEIGHT: 160.94 LBS

## 2022-02-28 DIAGNOSIS — M62.838 MUSCLE SPASM OF LEFT SHOULDER: Primary | ICD-10-CM

## 2022-02-28 PROCEDURE — 1159F PR MEDICATION LIST DOCUMENTED IN MEDICAL RECORD: ICD-10-PCS | Mod: CPTII,S$GLB,, | Performed by: STUDENT IN AN ORGANIZED HEALTH CARE EDUCATION/TRAINING PROGRAM

## 2022-02-28 PROCEDURE — 3079F DIAST BP 80-89 MM HG: CPT | Mod: CPTII,S$GLB,, | Performed by: STUDENT IN AN ORGANIZED HEALTH CARE EDUCATION/TRAINING PROGRAM

## 2022-02-28 PROCEDURE — 3074F SYST BP LT 130 MM HG: CPT | Mod: CPTII,S$GLB,, | Performed by: STUDENT IN AN ORGANIZED HEALTH CARE EDUCATION/TRAINING PROGRAM

## 2022-02-28 PROCEDURE — 99999 PR PBB SHADOW E&M-EST. PATIENT-LVL IV: ICD-10-PCS | Mod: PBBFAC,,, | Performed by: STUDENT IN AN ORGANIZED HEALTH CARE EDUCATION/TRAINING PROGRAM

## 2022-02-28 PROCEDURE — 3079F PR MOST RECENT DIASTOLIC BLOOD PRESSURE 80-89 MM HG: ICD-10-PCS | Mod: CPTII,S$GLB,, | Performed by: STUDENT IN AN ORGANIZED HEALTH CARE EDUCATION/TRAINING PROGRAM

## 2022-02-28 PROCEDURE — 3008F PR BODY MASS INDEX (BMI) DOCUMENTED: ICD-10-PCS | Mod: CPTII,S$GLB,, | Performed by: STUDENT IN AN ORGANIZED HEALTH CARE EDUCATION/TRAINING PROGRAM

## 2022-02-28 PROCEDURE — 3008F BODY MASS INDEX DOCD: CPT | Mod: CPTII,S$GLB,, | Performed by: STUDENT IN AN ORGANIZED HEALTH CARE EDUCATION/TRAINING PROGRAM

## 2022-02-28 PROCEDURE — 3074F PR MOST RECENT SYSTOLIC BLOOD PRESSURE < 130 MM HG: ICD-10-PCS | Mod: CPTII,S$GLB,, | Performed by: STUDENT IN AN ORGANIZED HEALTH CARE EDUCATION/TRAINING PROGRAM

## 2022-02-28 PROCEDURE — 99214 OFFICE O/P EST MOD 30 MIN: CPT | Mod: S$GLB,,, | Performed by: STUDENT IN AN ORGANIZED HEALTH CARE EDUCATION/TRAINING PROGRAM

## 2022-02-28 PROCEDURE — 1159F MED LIST DOCD IN RCRD: CPT | Mod: CPTII,S$GLB,, | Performed by: STUDENT IN AN ORGANIZED HEALTH CARE EDUCATION/TRAINING PROGRAM

## 2022-02-28 PROCEDURE — 1160F RVW MEDS BY RX/DR IN RCRD: CPT | Mod: CPTII,S$GLB,, | Performed by: STUDENT IN AN ORGANIZED HEALTH CARE EDUCATION/TRAINING PROGRAM

## 2022-02-28 PROCEDURE — 99999 PR PBB SHADOW E&M-EST. PATIENT-LVL IV: CPT | Mod: PBBFAC,,, | Performed by: STUDENT IN AN ORGANIZED HEALTH CARE EDUCATION/TRAINING PROGRAM

## 2022-02-28 PROCEDURE — 1160F PR REVIEW ALL MEDS BY PRESCRIBER/CLIN PHARMACIST DOCUMENTED: ICD-10-PCS | Mod: CPTII,S$GLB,, | Performed by: STUDENT IN AN ORGANIZED HEALTH CARE EDUCATION/TRAINING PROGRAM

## 2022-02-28 PROCEDURE — 99214 PR OFFICE/OUTPT VISIT, EST, LEVL IV, 30-39 MIN: ICD-10-PCS | Mod: S$GLB,,, | Performed by: STUDENT IN AN ORGANIZED HEALTH CARE EDUCATION/TRAINING PROGRAM

## 2022-02-28 RX ORDER — METHOCARBAMOL 500 MG/1
500 TABLET, FILM COATED ORAL 4 TIMES DAILY
Qty: 30 TABLET | Refills: 1 | Status: SHIPPED | OUTPATIENT
Start: 2022-02-28 | End: 2022-07-18

## 2022-02-28 NOTE — PROGRESS NOTES
Subjective:      Patient ID: Shelby Anderson is a 37 y.o. female.    Chief Complaint: Shoulder Pain (Pt is having left shoulder pain )    - shoulder pain  - returned to work following PT and 4 weeks off for spasm of right shoulder/neck  - was taking it easy and trying not to lift too much at work   - placed 2 weeks notice and was asked to get everything done prior to leaving; was trying to be careful with lifting but feels was probably doing to much bc on Friday left shoulder started to hurt and feel spasmed  - Saturday work up in terrible pain and restarted medication; NSAID and m relaxer and feeling slightly better today but still tight  - feels may be able to go back on Thursday with light duty  - new job will be from home and not require lifting     Review of Systems   Constitutional: Positive for activity change. Negative for unexpected weight change.   HENT: Negative for hearing loss, rhinorrhea and trouble swallowing.    Eyes: Negative for discharge and visual disturbance.   Respiratory: Negative for chest tightness and wheezing.    Cardiovascular: Negative for chest pain and palpitations.   Gastrointestinal: Negative for blood in stool, constipation, diarrhea and vomiting.   Endocrine: Negative for polydipsia and polyuria.   Genitourinary: Negative for difficulty urinating, dysuria, hematuria and menstrual problem.   Musculoskeletal: Positive for arthralgias and neck pain. Negative for joint swelling.   Neurological: Negative for weakness and headaches.   Psychiatric/Behavioral: Negative for confusion and dysphoric mood.        Objective:     Vitals:    02/28/22 0915   BP: 128/88   Pulse: 91   Temp: 98.1 °F (36.7 °C)      Physical Exam  Vitals reviewed.   Constitutional:       Appearance: Normal appearance.   HENT:      Head: Atraumatic.   Eyes:      Conjunctiva/sclera: Conjunctivae normal.   Cardiovascular:      Rate and Rhythm: Normal rate.   Pulmonary:      Effort: Pulmonary effort is normal.    Musculoskeletal:      Left shoulder: Swelling and tenderness present. Decreased range of motion.   Neurological:      General: No focal deficit present.      Mental Status: She is alert and oriented to person, place, and time.   Psychiatric:         Mood and Affect: Mood is anxious and depressed. Affect is tearful.         Behavior: Behavior normal.        Assessment:         1. Muscle spasm of left shoulder          Plan:   1. Muscle spasm of left shoulder  - methocarbamoL (ROBAXIN) 500 MG Tab; Take 1 tablet (500 mg total) by mouth 4 (four) times daily.  Dispense: 30 tablet; Refill: 1     Cont mobic daily and robaxin (refill)  Continue with stretches and heat/ice; topicals  Continue with therapies learned from PT  Rest; no heavy lifting   RTC prn         Sarah A. Champagne [facility]   2/28/22

## 2022-02-28 NOTE — LETTER
February 28, 2022    Shelby Anderson  [facility] [address] [place] [address]         [address] [address]  Phone: [phone]  Fax: [phone] February 28, 2022     Patient: Shelby Anderson   YOB: 1985   Date of Visit: 2/28/2022       To Whom It May Concern:    It is my medical opinion that Shelby Anderson may return to light duty 3/3/2022 with the following restrictions: no lifting over 5lbs.    If you have any questions or concerns, please don't hesitate to call.    Sincerely,        Cami Wilkins, DO

## 2022-05-03 ENCOUNTER — OFFICE VISIT (OUTPATIENT)
Dept: FAMILY MEDICINE | Facility: CLINIC | Age: 37
End: 2022-05-03
Payer: COMMERCIAL

## 2022-05-03 VITALS
BODY MASS INDEX: 26.59 KG/M2 | OXYGEN SATURATION: 100 % | WEIGHT: 144.5 LBS | DIASTOLIC BLOOD PRESSURE: 80 MMHG | TEMPERATURE: 99 F | HEART RATE: 100 BPM | HEIGHT: 62 IN | SYSTOLIC BLOOD PRESSURE: 116 MMHG

## 2022-05-03 DIAGNOSIS — Z13.0 SCREENING FOR BLOOD DISEASE: ICD-10-CM

## 2022-05-03 DIAGNOSIS — Z13.6 ENCOUNTER FOR SCREENING FOR CARDIOVASCULAR DISORDERS: ICD-10-CM

## 2022-05-03 DIAGNOSIS — Z00.00 WELLNESS EXAMINATION: ICD-10-CM

## 2022-05-03 DIAGNOSIS — J00 ACUTE NASOPHARYNGITIS: ICD-10-CM

## 2022-05-03 DIAGNOSIS — M79.10 MYALGIA: Primary | ICD-10-CM

## 2022-05-03 LAB
CTP QC/QA: YES
CTP QC/QA: YES
FLUAV AG NPH QL: NEGATIVE
FLUBV AG NPH QL: NEGATIVE
SARS-COV-2 RDRP RESP QL NAA+PROBE: NEGATIVE

## 2022-05-03 PROCEDURE — 96372 PR INJECTION,THERAP/PROPH/DIAG2ST, IM OR SUBCUT: ICD-10-PCS | Mod: S$GLB,,, | Performed by: STUDENT IN AN ORGANIZED HEALTH CARE EDUCATION/TRAINING PROGRAM

## 2022-05-03 PROCEDURE — 99214 PR OFFICE/OUTPT VISIT, EST, LEVL IV, 30-39 MIN: ICD-10-PCS | Mod: 25,S$GLB,, | Performed by: STUDENT IN AN ORGANIZED HEALTH CARE EDUCATION/TRAINING PROGRAM

## 2022-05-03 PROCEDURE — 99999 PR PBB SHADOW E&M-EST. PATIENT-LVL IV: ICD-10-PCS | Mod: PBBFAC,,, | Performed by: STUDENT IN AN ORGANIZED HEALTH CARE EDUCATION/TRAINING PROGRAM

## 2022-05-03 PROCEDURE — 87804 POCT INFLUENZA A/B: ICD-10-PCS | Mod: QW,S$GLB,, | Performed by: STUDENT IN AN ORGANIZED HEALTH CARE EDUCATION/TRAINING PROGRAM

## 2022-05-03 PROCEDURE — 99999 PR PBB SHADOW E&M-EST. PATIENT-LVL IV: CPT | Mod: PBBFAC,,, | Performed by: STUDENT IN AN ORGANIZED HEALTH CARE EDUCATION/TRAINING PROGRAM

## 2022-05-03 PROCEDURE — U0002 COVID-19 LAB TEST NON-CDC: HCPCS | Mod: QW,S$GLB,, | Performed by: STUDENT IN AN ORGANIZED HEALTH CARE EDUCATION/TRAINING PROGRAM

## 2022-05-03 PROCEDURE — 99214 OFFICE O/P EST MOD 30 MIN: CPT | Mod: 25,S$GLB,, | Performed by: STUDENT IN AN ORGANIZED HEALTH CARE EDUCATION/TRAINING PROGRAM

## 2022-05-03 PROCEDURE — 96372 THER/PROPH/DIAG INJ SC/IM: CPT | Mod: S$GLB,,, | Performed by: STUDENT IN AN ORGANIZED HEALTH CARE EDUCATION/TRAINING PROGRAM

## 2022-05-03 PROCEDURE — U0002: ICD-10-PCS | Mod: QW,S$GLB,, | Performed by: STUDENT IN AN ORGANIZED HEALTH CARE EDUCATION/TRAINING PROGRAM

## 2022-05-03 PROCEDURE — 87804 INFLUENZA ASSAY W/OPTIC: CPT | Mod: QW,S$GLB,, | Performed by: STUDENT IN AN ORGANIZED HEALTH CARE EDUCATION/TRAINING PROGRAM

## 2022-05-03 RX ORDER — BETAMETHASONE SODIUM PHOSPHATE AND BETAMETHASONE ACETATE 3; 3 MG/ML; MG/ML
9 INJECTION, SUSPENSION INTRA-ARTICULAR; INTRALESIONAL; INTRAMUSCULAR; SOFT TISSUE ONCE
Status: COMPLETED | OUTPATIENT
Start: 2022-05-03 | End: 2022-05-03

## 2022-05-03 RX ORDER — FLUTICASONE PROPIONATE 50 MCG
1 SPRAY, SUSPENSION (ML) NASAL 2 TIMES DAILY
Qty: 16 G | Refills: 0 | Status: SHIPPED | OUTPATIENT
Start: 2022-05-03 | End: 2022-05-25

## 2022-05-03 RX ADMIN — BETAMETHASONE SODIUM PHOSPHATE AND BETAMETHASONE ACETATE 9 MG: 3; 3 INJECTION, SUSPENSION INTRA-ARTICULAR; INTRALESIONAL; INTRAMUSCULAR; SOFT TISSUE at 10:05

## 2022-05-03 NOTE — PATIENT INSTRUCTIONS
Sudafed 12 hr AM   Flonase 2x/day  Xyzal PM  Tylenol/ibuprofen rotating for muscle aches and fevers

## 2022-05-03 NOTE — PROGRESS NOTES
Subjective:      Patient ID: Shelby Anderson is a 37 y.o. female.    Chief Complaint: Sore Throat (Pt is having a sore throat, ear pain, body aches, chills, headaches, pt states that it hurts when she takes a deep breath as well. )    - daughter dx via viral panel yesterday with rhinovirus; low grade fevers     Sore Throat   This is a new problem. The current episode started today. The problem has been unchanged. The pain is worse on the right side. There has been no fever. The pain is at a severity of 5/10. The pain is mild. Associated symptoms include congestion, coughing, ear pain and neck pain. Pertinent negatives include no abdominal pain, diarrhea, drooling, ear discharge, headaches, hoarse voice, plugged ear sensation, shortness of breath, stridor, swollen glands, trouble swallowing or vomiting. She has had no exposure to strep or mono. She has tried acetaminophen (this AM ) for the symptoms. The treatment provided mild relief.     Review of Systems   Constitutional: Positive for chills, fatigue and fever.   HENT: Positive for congestion, ear pain, postnasal drip, sinus pressure, sinus pain, sneezing and sore throat. Negative for drooling, ear discharge, hoarse voice and trouble swallowing.    Respiratory: Positive for cough. Negative for shortness of breath and stridor.    Gastrointestinal: Negative for abdominal pain, diarrhea, nausea and vomiting.   Musculoskeletal: Positive for myalgias and neck pain.   Neurological: Negative for headaches.   Psychiatric/Behavioral: Negative for sleep disturbance.        Objective:     Vitals:    05/03/22 0936   BP: 116/80   Pulse: 100   Temp: 99.2 °F (37.3 °C)      Physical Exam  Constitutional:       Appearance: Normal appearance. She is normal weight.   HENT:      Head: Normocephalic and atraumatic.      Right Ear: A middle ear effusion is present.      Left Ear: A middle ear effusion is present.      Nose: Congestion and rhinorrhea present.      Mouth/Throat:       Pharynx: Pharyngeal swelling and posterior oropharyngeal erythema present.   Eyes:      Conjunctiva/sclera: Conjunctivae normal.   Cardiovascular:      Rate and Rhythm: Normal rate and regular rhythm.      Heart sounds: Normal heart sounds.   Pulmonary:      Effort: Pulmonary effort is normal. No respiratory distress.      Breath sounds: Normal breath sounds. No wheezing, rhonchi or rales.   Abdominal:      Palpations: Abdomen is soft.   Musculoskeletal:      Cervical back: Tenderness present.      Right lower leg: No edema.      Left lower leg: No edema.   Lymphadenopathy:      Cervical: Cervical adenopathy present.   Neurological:      General: No focal deficit present.      Mental Status: She is alert.   Psychiatric:         Mood and Affect: Mood normal.         Behavior: Behavior normal.        Assessment:         1. Myalgia    2. Acute nasopharyngitis    3. Wellness examination    4. Encounter for screening for cardiovascular disorders    5. Screening for blood disease          Plan:   1. Myalgia  - POCT COVID-19 Rapid Screening  - POCT Influenza A/B    2. Acute nasopharyngitis  - fluticasone propionate (FLONASE) 50 mcg/actuation nasal spray; 1 spray (50 mcg total) by Each Nostril route 2 (two) times a day.  Dispense: 16 g; Refill: 0  - betamethasone acetate-betamethasone sodium phosphate injection 9 mg      COVID neg; Flu neg   Assume rhinovirus same as daughter  IM celestone today; start AM Sudafed, PM xyzal; BID flonase;encouraged fluid; REST; steam   RTC if symptoms worsen or fail to improve and in next month or so for wellness exam with labs prior           [facility]   5/3/22

## 2022-05-23 ENCOUNTER — PATIENT MESSAGE (OUTPATIENT)
Dept: FAMILY MEDICINE | Facility: CLINIC | Age: 37
End: 2022-05-23
Payer: MEDICAID

## 2022-05-24 ENCOUNTER — PATIENT MESSAGE (OUTPATIENT)
Dept: FAMILY MEDICINE | Facility: CLINIC | Age: 37
End: 2022-05-24
Payer: MEDICAID

## 2022-05-24 NOTE — TELEPHONE ENCOUNTER
Called and spoke with pt in regards of message. Informed pt to send me fax number, and I will fax orders over.

## 2022-05-25 LAB
ERYTHROCYTE [DISTWIDTH] IN BLOOD BY AUTOMATED COUNT: 12.4 % (ref 11–15)
HCT VFR BLD AUTO: 37.5 % (ref 35–45)
HGB BLD-MCNC: 12.2 G/DL (ref 11.7–15.5)
MCH RBC QN AUTO: 29.3 PG (ref 27–33)
MCHC RBC AUTO-ENTMCNC: 32.5 G/DL (ref 32–36)
MCV RBC AUTO: 90.1 FL (ref 80–100)
PLATELET # BLD AUTO: 253 THOUSAND/UL (ref 140–400)
PMV BLD REES-ECKER: 9.8 FL (ref 7.5–12.5)
RBC # BLD AUTO: 4.16 MILLION/UL (ref 3.8–5.1)
WBC # BLD AUTO: 10.8 THOUSAND/UL (ref 3.8–10.8)

## 2022-05-26 ENCOUNTER — PATIENT MESSAGE (OUTPATIENT)
Dept: FAMILY MEDICINE | Facility: CLINIC | Age: 37
End: 2022-05-26
Payer: MEDICAID

## 2022-05-26 LAB
ALBUMIN SERPL-MCNC: 4.3 G/DL (ref 3.6–5.1)
ALBUMIN/GLOB SERPL: 1.4 (CALC) (ref 1–2.5)
ALP SERPL-CCNC: 40 U/L (ref 31–125)
ALT SERPL-CCNC: 10 U/L (ref 6–29)
AST SERPL-CCNC: 13 U/L (ref 10–30)
BILIRUB SERPL-MCNC: 0.5 MG/DL (ref 0.2–1.2)
BUN SERPL-MCNC: 16 MG/DL (ref 7–25)
BUN/CREAT SERPL: NORMAL (CALC) (ref 6–22)
CALCIUM SERPL-MCNC: 9.1 MG/DL (ref 8.6–10.2)
CHLORIDE SERPL-SCNC: 108 MMOL/L (ref 98–110)
CHOLEST SERPL-MCNC: 208 MG/DL
CHOLEST/HDLC SERPL: 4.1 (CALC)
CO2 SERPL-SCNC: 25 MMOL/L (ref 20–32)
CREAT SERPL-MCNC: 0.88 MG/DL (ref 0.5–1.1)
GLOBULIN SER CALC-MCNC: 3 G/DL (CALC) (ref 1.9–3.7)
GLUCOSE SERPL-MCNC: 72 MG/DL (ref 65–139)
HBA1C MFR BLD: 5.1 % OF TOTAL HGB
HDLC SERPL-MCNC: 51 MG/DL
LDLC SERPL CALC-MCNC: 141 MG/DL (CALC)
NONHDLC SERPL-MCNC: 157 MG/DL (CALC)
POTASSIUM SERPL-SCNC: 3.9 MMOL/L (ref 3.5–5.3)
PROT SERPL-MCNC: 7.3 G/DL (ref 6.1–8.1)
SODIUM SERPL-SCNC: 143 MMOL/L (ref 135–146)
TRIGL SERPL-MCNC: 67 MG/DL
TSH SERPL-ACNC: 0.57 MIU/L

## 2022-06-14 ENCOUNTER — OFFICE VISIT (OUTPATIENT)
Dept: FAMILY MEDICINE | Facility: CLINIC | Age: 37
End: 2022-06-14
Payer: COMMERCIAL

## 2022-06-14 VITALS
SYSTOLIC BLOOD PRESSURE: 114 MMHG | TEMPERATURE: 98 F | DIASTOLIC BLOOD PRESSURE: 86 MMHG | BODY MASS INDEX: 27.81 KG/M2 | HEART RATE: 79 BPM | OXYGEN SATURATION: 99 % | HEIGHT: 62 IN | WEIGHT: 151.13 LBS

## 2022-06-14 DIAGNOSIS — M62.838 MUSCLE SPASMS OF NECK: ICD-10-CM

## 2022-06-14 DIAGNOSIS — Z00.00 WELLNESS EXAMINATION: Primary | ICD-10-CM

## 2022-06-14 DIAGNOSIS — E78.00 ELEVATED LDL CHOLESTEROL LEVEL: ICD-10-CM

## 2022-06-14 PROCEDURE — 99395 PREV VISIT EST AGE 18-39: CPT | Mod: 25,S$GLB,, | Performed by: STUDENT IN AN ORGANIZED HEALTH CARE EDUCATION/TRAINING PROGRAM

## 2022-06-14 PROCEDURE — 99499 NO LOS: ICD-10-PCS | Mod: S$GLB,,, | Performed by: STUDENT IN AN ORGANIZED HEALTH CARE EDUCATION/TRAINING PROGRAM

## 2022-06-14 PROCEDURE — 99214 OFFICE O/P EST MOD 30 MIN: CPT | Mod: PBBFAC,PN | Performed by: STUDENT IN AN ORGANIZED HEALTH CARE EDUCATION/TRAINING PROGRAM

## 2022-06-14 PROCEDURE — 99999 PR PBB SHADOW E&M-EST. PATIENT-LVL IV: CPT | Mod: PBBFAC,,, | Performed by: STUDENT IN AN ORGANIZED HEALTH CARE EDUCATION/TRAINING PROGRAM

## 2022-06-14 PROCEDURE — 99395 PR PREVENTIVE VISIT,EST,18-39: ICD-10-PCS | Mod: 25,S$GLB,, | Performed by: STUDENT IN AN ORGANIZED HEALTH CARE EDUCATION/TRAINING PROGRAM

## 2022-06-14 PROCEDURE — 99999 PR PBB SHADOW E&M-EST. PATIENT-LVL IV: ICD-10-PCS | Mod: PBBFAC,,, | Performed by: STUDENT IN AN ORGANIZED HEALTH CARE EDUCATION/TRAINING PROGRAM

## 2022-06-14 PROCEDURE — 99499 UNLISTED E&M SERVICE: CPT | Mod: S$GLB,,, | Performed by: STUDENT IN AN ORGANIZED HEALTH CARE EDUCATION/TRAINING PROGRAM

## 2022-06-14 RX ORDER — MULTIVITAMIN
1 TABLET ORAL DAILY
COMMUNITY
End: 2023-10-24

## 2022-06-15 PROBLEM — E78.00 ELEVATED LDL CHOLESTEROL LEVEL: Status: ACTIVE | Noted: 2022-06-15

## 2022-06-15 PROBLEM — M62.838 MUSCLE SPASMS OF NECK: Status: ACTIVE | Noted: 2022-06-15

## 2022-06-15 NOTE — PROGRESS NOTES
Subjective:       Patient ID: Shelby Anderson is a 37 y.o. female.    Chief Complaint: Annual Exam (Pt here for her Wellness Exam )    Elevated LDL cholesterol level  Noted on labs     Wants to avoid starting medication if can  Will work on diet and exercise and also open to red rice yeast   + fam hx    Muscle spasms of neck  Chronic   Never really goes away  Still does cupping at home and stretches; prn robaxin and mobic as well as topicals   Open to seeing sports med osteopath      No new complaints today   overall feels well     Review of Systems   Constitutional: Negative for fever.   HENT: Negative.    Respiratory: Negative for cough, shortness of breath and wheezing.    Cardiovascular: Negative for chest pain and leg swelling.   Gastrointestinal: Negative for abdominal pain, diarrhea, nausea and vomiting.   Genitourinary: Negative.  Negative for difficulty urinating, dysuria and frequency.   Musculoskeletal: Positive for arthralgias, myalgias, neck pain and neck stiffness.   Neurological: Negative.  Negative for dizziness, numbness and headaches.   Psychiatric/Behavioral: Negative for dysphoric mood. The patient is not nervous/anxious.       Objective:      Vitals:    06/14/22 0905   BP: 114/86   Pulse: 79   Temp: 98.2 °F (36.8 °C)      Physical Exam  Constitutional:       Appearance: Normal appearance. She is overweight.   HENT:      Head: Normocephalic and atraumatic.   Eyes:      Conjunctiva/sclera: Conjunctivae normal.   Cardiovascular:      Rate and Rhythm: Normal rate and regular rhythm.      Heart sounds: Normal heart sounds.   Pulmonary:      Effort: Pulmonary effort is normal.      Breath sounds: Normal breath sounds.   Abdominal:      Palpations: Abdomen is soft.      Tenderness: There is no abdominal tenderness.   Musculoskeletal:      Cervical back: Edema, spasms and tenderness present. Decreased range of motion.      Right lower leg: No edema.      Left lower leg: No edema.    Neurological:      General: No focal deficit present.      Mental Status: She is alert and oriented to person, place, and time.   Psychiatric:         Mood and Affect: Mood normal.         Behavior: Behavior normal.          Assessment:       1. Wellness examination    2. Muscle spasms of neck    3. Elevated LDL cholesterol level        Plan:   1. Wellness examination    2. Muscle spasms of neck  - Ambulatory referral/consult to Sports Medicine; Future    3. Elevated LDL cholesterol level  - Lipid Panel; Future  - Comprehensive Metabolic Panel; Future  - Lipid Panel  - Comprehensive Metabolic Panel    Well woman  Labs reviewed in detail  Repeat CMP and lipids in 6 months time   HM discussed: UTD  Continue healthy lifestyle efforts  Continue current meds as prescribed  Keep routine specialist f/u   Refer to sports med for OMM  RTC in 6 months with labs prior and/or PRN       [facility]   6/14/22

## 2022-06-20 ENCOUNTER — TELEPHONE (OUTPATIENT)
Dept: SPORTS MEDICINE | Facility: CLINIC | Age: 37
End: 2022-06-20
Payer: MEDICAID

## 2022-06-20 NOTE — TELEPHONE ENCOUNTER
LVM for the Pt to call back so that we can make her an appt for the referral we received for her.  Provided call back number [phone]

## 2022-06-21 ENCOUNTER — TELEPHONE (OUTPATIENT)
Dept: SPORTS MEDICINE | Facility: CLINIC | Age: 37
End: 2022-06-21
Payer: MEDICAID

## 2022-06-21 NOTE — TELEPHONE ENCOUNTER
Spoke to the Pt about the referral from  For her neck and shoulder pain.  We were able to find a date and time that worked for her.  Pt confirmed the details.

## 2022-06-27 ENCOUNTER — TELEPHONE (OUTPATIENT)
Dept: SPORTS MEDICINE | Facility: CLINIC | Age: 37
End: 2022-06-27
Payer: MEDICAID

## 2022-06-27 NOTE — TELEPHONE ENCOUNTER
Spoke to the Pt about her appt today and informed her that we will need to reschedule due to the Dr being out for a family emergency. Pt understood and we found another time and date for her. Pt confirmed all details.

## 2022-07-18 ENCOUNTER — OFFICE VISIT (OUTPATIENT)
Dept: SPORTS MEDICINE | Facility: CLINIC | Age: 37
End: 2022-07-18
Payer: COMMERCIAL

## 2022-07-18 VITALS
WEIGHT: 151 LBS | HEIGHT: 62 IN | SYSTOLIC BLOOD PRESSURE: 125 MMHG | DIASTOLIC BLOOD PRESSURE: 83 MMHG | BODY MASS INDEX: 27.79 KG/M2

## 2022-07-18 DIAGNOSIS — M99.02 SOMATIC DYSFUNCTION OF THORACIC REGION: ICD-10-CM

## 2022-07-18 DIAGNOSIS — M99.01 SOMATIC DYSFUNCTION OF CERVICAL REGION: ICD-10-CM

## 2022-07-18 DIAGNOSIS — M99.00 CRANIAL SOMATIC DYSFUNCTION: ICD-10-CM

## 2022-07-18 DIAGNOSIS — M99.07 SOMATIC DYSFUNCTION OF UPPER EXTREMITY: ICD-10-CM

## 2022-07-18 DIAGNOSIS — M54.9 UPPER BACK PAIN: Primary | ICD-10-CM

## 2022-07-18 DIAGNOSIS — M99.08 SOMATIC DYSFUNCTION OF RIB CAGE REGION: ICD-10-CM

## 2022-07-18 DIAGNOSIS — M54.2 NECK PAIN: ICD-10-CM

## 2022-07-18 DIAGNOSIS — M62.838 MUSCLE SPASMS OF NECK: ICD-10-CM

## 2022-07-18 PROCEDURE — 99204 OFFICE O/P NEW MOD 45 MIN: CPT | Mod: 25,S$GLB,, | Performed by: ORTHOPAEDIC SURGERY

## 2022-07-18 PROCEDURE — 98927 PR OSTEOPATHIC MANIP,5-6 BODY REGN: ICD-10-PCS | Mod: S$GLB,,, | Performed by: ORTHOPAEDIC SURGERY

## 2022-07-18 PROCEDURE — 99213 OFFICE O/P EST LOW 20 MIN: CPT | Mod: PBBFAC,PN | Performed by: ORTHOPAEDIC SURGERY

## 2022-07-18 PROCEDURE — 99204 PR OFFICE/OUTPT VISIT, NEW, LEVL IV, 45-59 MIN: ICD-10-PCS | Mod: 25,S$GLB,, | Performed by: ORTHOPAEDIC SURGERY

## 2022-07-18 PROCEDURE — 99999 PR PBB SHADOW E&M-EST. PATIENT-LVL III: ICD-10-PCS | Mod: PBBFAC,,, | Performed by: ORTHOPAEDIC SURGERY

## 2022-07-18 PROCEDURE — 97110 THERAPEUTIC EXERCISES: CPT | Mod: S$GLB,,, | Performed by: ORTHOPAEDIC SURGERY

## 2022-07-18 PROCEDURE — 98927 OSTEOPATH MANJ 5-6 REGIONS: CPT | Mod: PBBFAC,PN | Performed by: ORTHOPAEDIC SURGERY

## 2022-07-18 PROCEDURE — 98927 OSTEOPATH MANJ 5-6 REGIONS: CPT | Mod: S$GLB,,, | Performed by: ORTHOPAEDIC SURGERY

## 2022-07-18 PROCEDURE — 97110 PR THERAPEUTIC EXERCISES: ICD-10-PCS | Mod: S$GLB,,, | Performed by: ORTHOPAEDIC SURGERY

## 2022-07-18 PROCEDURE — 99999 PR PBB SHADOW E&M-EST. PATIENT-LVL III: CPT | Mod: PBBFAC,,, | Performed by: ORTHOPAEDIC SURGERY

## 2022-07-18 NOTE — PROGRESS NOTES
"CC: bilateral shoulder pain    37 y.o. Female presents today for evaluation of her bilateral shoulder pain. She admits to bilateral shoulder pain, left worse than right without known mechanism of injury. She attributes her pain to working four ten hour shifts per week where she sits at her desk while working from home for long periods of time. She initially saw Dr. Wilkins 06/14/2022 and was subsequently referred. She gestures to the trapezius muscle bilaterally when asked where she hurts and also notes scapular pain most specifically on the left.  How long: Beginning November/December 2021.   What makes it better: Admits to improved pain with activity and with cupping.   What makes it worse: Admits to increased pain with sitting at her desk for long periods of time.   Does it radiate: Denies radiating pain.   Attempted treatments: Admits to taking meloxicam and robaxin when she initially saw her primary care provider for this complaint. She has been taking ibuprofen as needed. She admits to have her boyfriend performing cupping approximally once weekly and believes this has been helpful. She denies history of neck or shoulder injection/surgery.   Pain score: 4/10  Any mechanical symptoms: Admits to occasional "popping" affecting her upper back.  Feelings of instability: Denies feelings of instability.   Affecting ADLs: Denies this problem affecting her ability to perform ADLs.       PAST MEDICAL HISTORY:   History reviewed. No pertinent past medical history.    PAST SURGICAL HISTORY:   History reviewed. No pertinent surgical history.    FAMILY HISTORY:   History reviewed. No pertinent family history.    SOCIAL HISTORY:   Social History     Socioeconomic History    Marital status: Single   Tobacco Use    Smoking status: Never Smoker    Smokeless tobacco: Never Used       MEDICATIONS:     Current Outpatient Medications:     methyl salicylate-menthol (ICY HOT) 29-7.6 % Oint, , Disp: , Rfl:     multivitamin " "(THERAGRAN) per tablet, Take 1 tablet by mouth once daily., Disp: , Rfl:     ALLERGIES:   Review of patient's allergies indicates:   Allergen Reactions    Guaifenesin         PHYSICAL EXAMINATION:  /83   Ht 5' 2" (1.575 m)   Wt 68.5 kg (151 lb)   BMI 27.62 kg/m²   Vitals signs and nursing note have been reviewed.  General: In no acute distress, well developed, well nourished, no diaphoresis  Eyes: EOM full and smooth, no eye redness or discharge  HENT: normocephalic and atraumatic, neck supple, trachea midline, no nasal discharge, no external ear redness or discharge  Cardiovascular: 2+ and symmetric radial bilaterally, no LE edema  Lungs: respirations non-labored, no conversational dyspnea   Abd: non-distended, no rigidity  MSK: no amputation or deformity, no swelling of extremities  Neuro: AAOx3, CN2-12 grossly intact  Skin: No rashes, warm and dry  Psychiatric: cooperative, pleasant, mood and affect appropriate for age    SHOULDER: BILATERAL  The affected shoulder is compared to the contralateral shoulder.    Observation:    CERVICAL SPINE  Normal head carriage. Normal thoracic kyphosis.  Full AROM in flexion, extension, sidebending, and rotation.    SHOULDER  No ecchymosis, edema, or erythema throughout the shoulder girdle.  No sternal, clavicular, or acromial deformities bilaterally.  No atrophy of the pectorals, deltoids, supraspinatus, infraspinatus, or biceps bilaterally.  No asymmetry of shoulders bilaterally.    ROM:  Active flexion to 180° on left and 180° on right.   Active abduction to 180° on left and 180° on right.    Active internal rotation to T7 on left and T7 on right.    Active external rotation to T4 on left and T4 on right.    No scapular dyskinesia or winging.    Tenderness:  No tenderness at the SC or AC joint  No tenderness over the clavicle   No tenderness over biceps tendon in the bicipital groove  No tenderness over subacromial space  + tenderness over the first rib " bilaterally  + tenderness over the trapezius muscle bilaterally    Strength Testing:  Deltoid - 5/5 on left and 5/5 on right  Biceps - 5/5 on left and 5/5 on right  Triceps - 5/5 on left and 5/5 on right  Wrist extension - 5/5 on left and 5/5 on right  Wrist flexion - 5/5 on left and 5/5 on right   - 5/5 on left and 5/5 on right  Finger extension - 5/5 on left and 5/5 on right  Finger abduction - 5/5 on left and 5/5 on right    Special Tests:  Empty can test - negative  Full can test - negative  Bear hug test - negative  Belly press test - negative  Resisted internal rotation - negative  Resisted external rotation - negative    Neer's test - negative  Hawkin's-Mikhail test - negative    OKevins test - negative    Speed's test - negative  Yergason's test - negative    Sulcus sign - none  AP load and shift laxity - none  Anterior apprehension test - negative    Posture:  Upright  Gait: Non-antalgic     TART (Tissue texture abnormality, Asymmetry,  Restriction of motion and/or Tenderness) changes:    Head: Occipitoatlantal (OA) Joint ES-left, R-right     Cervical Spine  Thoracic Spine  Lumbar Spine   C1 Neutral T1 NSRRL L1 Neutral   C2 ERSR T2 NSRRL L2 Neutral   C3 ERSR T3 NSRRL L3 Neutral   C4 Neutral T4 NSRRL L4 Neutral   C5 Neutral T5 Neutral L5 Neutral   C6 ERSL T6 Neutral     C7 ERSL T7 FRSR       T8 FRSR       T9 Neutral       T10 Neutral       T11 Neutral       T12 Neutral       Ribs:  Superior rib 1 on the left  External torsion rib 3 on the left    Upper Extremity:  Periscapular myofascial restriction bilaterally, more pronounced on the left  Fascial herniated trigger point at the lateral aspect of the left trapezius muscle  Fascial herniated trigger point at the medial aspect of the right trapezius muscle    Abdomen:    Pelvis:  · Innominate:Neutral  · Pubic bone:Neutral    Sacrum:Neutral    Lower Extremity:      Key   F= Flexed   E = Extended   R = Rotated   N = Neutral   S = Sidebent   TTA = tissue  texture abnormality   L/R/B = left/right/bilateral (last letter)       Neurovascular Exam:  2+ radial pulses BL  Sensation intact to light touch in the distal median, radial, and ulnar nerve distributions bilaterally.  Spurlings test - negative  Lhermittes test - negative  Capillary refill intact <2 seconds in all digits bilaterally        ASSESSMENT:    1. Upper back pain    2. Neck pain    3. Muscle spasms of neck    4. Somatic dysfunction of cervical region    5. Cranial somatic dysfunction    6. Somatic dysfunction of thoracic region    7. Somatic dysfunction of rib cage region    8. Somatic dysfunction of upper extremity        PLAN:  1-3.  Upper back pain/neck pain/muscle spasm of neck -     - Shelby admits to bilateral shoulder pain, left worse than right beginning in November/December of 2021 that she attributes to long days working at her desk.  She has found some improvement with stretching and cupping but symptoms will return with work activity.    - Symptoms, exam, and imaging are most consistent with upper back and postural dysfunction secondary to being seated at a computer while at work at home.  We discussed the importance of decreasing inflammation and strengthening and stabilizing to help promote and maintain symptom improvement/resolution.  This is commonly accomplished with a short course of an anti-inflammatory and icing in addition to osteopathic manipulation, a home exercise program or physical therapy.    - Based on her description of pain/body language and somatic dysfunction identified on exam, I discussed osteopathic manipulation as a treatment option today. She consents to evaluation and treatment. See below.    - HEP for cat/cow, snow angels, shoulder circles, Ethiopian prescribed today. Handouts provided, explained, and exercises were demonstrated as needed. Encouraged to do daily. [address] HOME EXERCISE PROGRAM (HEP):  The patient was taught a homegoing physical therapy regimen as  described above by provider with assistance of sports medicine assistant. The patient demonstrated understanding of the exercises and proper technique of their execution. This interaction took 15 minutes.       4-8.  Somatic dysfunction of upper extremity, thoracic, cervical, cranial, ribcage regions -     - OMT 5-6 regions. Oral consent obtained. Reviewed benefits and potential side effects. OMT indicated today due to signs and symptoms as well as local and remote somatic dysfunction findings and their related neurokinetic, lymphatic, fascial and/or arteriovenous body connections. OMT techniques used: Soft Tissue, Myofascial Release, Muscle Energy and Fascial Distortion Model. Treatment was tolerated well. Improvement noted in segmental mobility post-treatment in dysfunctional regions. There were no adverse events and no complications immediately following treatment. Advised plenty of water to help alleviate soreness.      Future planning includes - possibly more OMT if helpful and if indicated, add PT    All questions were answered to the best of my ability and all concerns were addressed at this time.    Follow up in 2-3 weeks for above, or sooner if needed.      This note is dictated using the M*Modal Fluency Direct word recognition program. There are word recognition mistakes that are occasionally missed on review.

## 2022-08-29 ENCOUNTER — OFFICE VISIT (OUTPATIENT)
Dept: SPORTS MEDICINE | Facility: CLINIC | Age: 37
End: 2022-08-29
Payer: COMMERCIAL

## 2022-08-29 VITALS
BODY MASS INDEX: 27.79 KG/M2 | HEIGHT: 62 IN | SYSTOLIC BLOOD PRESSURE: 120 MMHG | DIASTOLIC BLOOD PRESSURE: 83 MMHG | WEIGHT: 151 LBS

## 2022-08-29 DIAGNOSIS — M62.838 MUSCLE SPASMS OF NECK: ICD-10-CM

## 2022-08-29 DIAGNOSIS — M54.2 NECK PAIN: ICD-10-CM

## 2022-08-29 DIAGNOSIS — M99.08 SOMATIC DYSFUNCTION OF RIB CAGE REGION: ICD-10-CM

## 2022-08-29 DIAGNOSIS — M99.02 SOMATIC DYSFUNCTION OF THORACIC REGION: ICD-10-CM

## 2022-08-29 DIAGNOSIS — M99.07 SOMATIC DYSFUNCTION OF UPPER EXTREMITY: ICD-10-CM

## 2022-08-29 DIAGNOSIS — M54.9 UPPER BACK PAIN: Primary | ICD-10-CM

## 2022-08-29 PROCEDURE — 99999 PR PBB SHADOW E&M-EST. PATIENT-LVL III: ICD-10-PCS | Mod: PBBFAC,,, | Performed by: ORTHOPAEDIC SURGERY

## 2022-08-29 PROCEDURE — 98926 PR OSTEOPATHIC MANIP,3-4 BODY REGN: ICD-10-PCS | Mod: S$GLB,,, | Performed by: ORTHOPAEDIC SURGERY

## 2022-08-29 PROCEDURE — 98926 OSTEOPATH MANJ 3-4 REGIONS: CPT | Mod: PBBFAC,PN | Performed by: ORTHOPAEDIC SURGERY

## 2022-08-29 PROCEDURE — 99213 OFFICE O/P EST LOW 20 MIN: CPT | Mod: 25,S$GLB,, | Performed by: ORTHOPAEDIC SURGERY

## 2022-08-29 PROCEDURE — 99999 PR PBB SHADOW E&M-EST. PATIENT-LVL III: CPT | Mod: PBBFAC,,, | Performed by: ORTHOPAEDIC SURGERY

## 2022-08-29 PROCEDURE — 99213 OFFICE O/P EST LOW 20 MIN: CPT | Mod: PBBFAC,25,PN | Performed by: ORTHOPAEDIC SURGERY

## 2022-08-29 PROCEDURE — 98926 OSTEOPATH MANJ 3-4 REGIONS: CPT | Mod: S$GLB,,, | Performed by: ORTHOPAEDIC SURGERY

## 2022-08-29 PROCEDURE — 99213 PR OFFICE/OUTPT VISIT, EST, LEVL III, 20-29 MIN: ICD-10-PCS | Mod: 25,S$GLB,, | Performed by: ORTHOPAEDIC SURGERY

## 2022-08-29 NOTE — PROGRESS NOTES
"CC: bilateral shoulder pain    Shelby is here today for follow up evaluation of her bilateral shoulder pain. Patient reports her pain is 4/10 today and she is feeling 60% improved since her last visit which she attributes to OMT and compliance with her HEP. She admits to a slip and fall on steps that had algae on them 1 weeks ago, but does not feel as though this increased her shoulder pain. She states she is feeling most "tight" on the right and has had 1 muscle spasm since her last visit.     Recall from visit on 07/18/2022  37 y.o. Female presents today for evaluation of her bilateral shoulder pain. She admits to bilateral shoulder pain, left worse than right without known mechanism of injury. She attributes her pain to working four ten hour shifts per week where she sits at her desk while working from home for long periods of time. She initially saw Dr. Wilkins 06/14/2022 and was subsequently referred. She gestures to the trapezius muscle bilaterally when asked where she hurts and also notes scapular pain most specifically on the left.  How long: Beginning November/December 2021.   What makes it better: Admits to improved pain with activity and with cupping.   What makes it worse: Admits to increased pain with sitting at her desk for long periods of time.   Does it radiate: Denies radiating pain.   Attempted treatments: Admits to taking meloxicam and robaxin when she initially saw her primary care provider for this complaint. She has been taking ibuprofen as needed. She admits to have her boyfriend performing cupping approximally once weekly and believes this has been helpful. She denies history of neck or shoulder injection/surgery.   Pain score: 4/10  Any mechanical symptoms: Admits to occasional "popping" affecting her upper back.  Feelings of instability: Denies feelings of instability.   Affecting ADLs: Denies this problem affecting her ability to perform ADLs.       PAST MEDICAL HISTORY:   History " "reviewed. No pertinent past medical history.    PAST SURGICAL HISTORY:   History reviewed. No pertinent surgical history.    FAMILY HISTORY:   History reviewed. No pertinent family history.    SOCIAL HISTORY:   Social History     Socioeconomic History    Marital status: Single   Tobacco Use    Smoking status: Never    Smokeless tobacco: Never       MEDICATIONS:     Current Outpatient Medications:     methyl salicylate-menthol (ICY HOT) 29-7.6 % Oint, , Disp: , Rfl:     multivitamin (THERAGRAN) per tablet, Take 1 tablet by mouth once daily., Disp: , Rfl:     ALLERGIES:   Review of patient's allergies indicates:   Allergen Reactions    Guaifenesin         PHYSICAL EXAMINATION:  /83   Ht 5' 2" (1.575 m)   Wt 68.5 kg (151 lb)   BMI 27.62 kg/m²   Vitals signs and nursing note have been reviewed.  General: In no acute distress, well developed, well nourished, no diaphoresis  Eyes: EOM full and smooth, no eye redness or discharge  HENT: normocephalic and atraumatic, neck supple, trachea midline, no nasal discharge, no external ear redness or discharge  Cardiovascular: 2+ and symmetric radial bilaterally, no LE edema  Lungs: respirations non-labored, no conversational dyspnea   Abd: non-distended, no rigidity  MSK: no amputation or deformity, no swelling of extremities  Neuro: AAOx3, CN2-12 grossly intact  Skin: No rashes, warm and dry  Psychiatric: cooperative, pleasant, mood and affect appropriate for age    SHOULDER: BILATERAL  The affected shoulder is compared to the contralateral shoulder.    Observation:    CERVICAL SPINE  Normal head carriage. Normal thoracic kyphosis.  Full AROM in flexion, extension, sidebending, and rotation.    SHOULDER  No ecchymosis, edema, or erythema throughout the shoulder girdle.  No sternal, clavicular, or acromial deformities bilaterally.  No atrophy of the pectorals, deltoids, supraspinatus, infraspinatus, or biceps bilaterally.  No asymmetry of shoulders " bilaterally.    ROM:  Active flexion to 180° on left and 180° on right.   Active abduction to 180° on left and 180° on right.    Active internal rotation to T7 on left and T7 on right.    Active external rotation to T4 on left and T4 on right.    No scapular dyskinesia or winging.    Tenderness:  No tenderness at the SC or AC joint  No tenderness over the clavicle   No tenderness over biceps tendon in the bicipital groove  No tenderness over subacromial space  + tenderness over the first rib on the right  + tenderness over the trapezius muscle on the right    Strength Testing:  Deltoid - 5/5 on left and 5/5 on right  Biceps - 5/5 on left and 5/5 on right  Triceps - 5/5 on left and 5/5 on right  Wrist extension - 5/5 on left and 5/5 on right  Wrist flexion - 5/5 on left and 5/5 on right   - 5/5 on left and 5/5 on right  Finger extension - 5/5 on left and 5/5 on right  Finger abduction - 5/5 on left and 5/5 on right    Special Tests:  Empty can test - negative  Full can test - negative  Bear hug test - negative  Belly press test - negative  Resisted internal rotation - negative  Resisted external rotation - negative    Neer's test - negative  Hawkin's-Mikhail test - negative    OKevins test - negative    Speed's test - negative  Yergason's test - negative    Sulcus sign - none  AP load and shift laxity - none  Anterior apprehension test - negative    Posture:  Upright  Gait: Non-antalgic     TART (Tissue texture abnormality, Asymmetry,  Restriction of motion and/or Tenderness) changes:    Head:      Cervical Spine  Thoracic Spine  Lumbar Spine   C1 Neutral T1 Neutral L1 Neutral   C2 Neutral T2 Neutral L2 Neutral   C3 Neutral T3 NSRRL L3 Neutral   C4 Neutral T4 NSRRL L4 Neutral   C5 Neutral T5 NSRRL L5 Neutral   C6 Neutral T6 Neutral     C7 Neutral T7 Neutral       T8 Neutral       T9 Neutral       T10 Neutral       T11 Neutral       T12 Neutral       Ribs:  Superior rib 1 on the right  External torsion rib 4 on  "the right      Upper Extremity:  Periscapular myofascial restriction on the right  Fascial herniated trigger point at the superior medial aspect of the right scapula    Abdomen:    Pelvis:  Innominate:Neutral  Pubic bone:Neutral    Sacrum:Neutral    Lower Extremity:      Key   F= Flexed   E = Extended   R = Rotated   N = Neutral   S = Sidebent   TTA = tissue texture abnormality   L/R/B = left/right/bilateral (last letter)       Neurovascular Exam:  2+ radial pulses BL  Sensation intact to light touch in the distal median, radial, and ulnar nerve distributions bilaterally.  Spurlings test - negative  Lhermittes test - negative  Capillary refill intact <2 seconds in all digits bilaterally        ASSESSMENT:    1. Upper back pain    2. Neck pain    3. Muscle spasms of neck    4. Somatic dysfunction of upper extremity    5. Somatic dysfunction of thoracic region    6. Somatic dysfunction of rib cage region          PLAN:  1-3.  Upper back pain/neck pain/muscle spasm of neck - improved    - Shelby admits to bilateral shoulder pain, left worse than right beginning in November/December of 2021 that she attributes to long days working at her desk.  She has found some improvement with stretching and cupping but symptoms will return with work activity.    - She is now feeling 60% improved which she attributes to compliance with her HEP and OMT. She states she has only had 1 muscle spasm since her last visit and is pleased with this. She admits to feeling more "tight" on the right today.     - Based on her description of pain/body language and somatic dysfunction identified on exam, I discussed osteopathic manipulation as a treatment option today. She consents to evaluation and treatment. See below.    - Continue with HEP for cat/cow, snow angels, shoulder circles, [place] prescribed at initial visit.       4-6,.  Somatic dysfunction of upper extremity, thoracic, ribcage regions -     - OMT 3-4 regions. Oral consent " obtained. Reviewed benefits and potential side effects. OMT indicated today due to signs and symptoms as well as local and remote somatic dysfunction findings and their related neurokinetic, lymphatic, fascial and/or arteriovenous body connections. OMT techniques used: Soft Tissue, Myofascial Release, Muscle Energy and Fascial Distortion Model. Treatment was tolerated well. Improvement noted in segmental mobility post-treatment in dysfunctional regions. There were no adverse events and no complications immediately following treatment. Advised plenty of water to help alleviate soreness.      Future planning includes - possibly more OMT if helpful and if indicated, add PT    All questions were answered to the best of my ability and all concerns were addressed at this time.    Follow up in 3-4 weeks for above, or sooner if needed.      This note is dictated using the M*Modal Fluency Direct word recognition program. There are word recognition mistakes that are occasionally missed on review.      Total time spent face-to face with patient counseling or coordinating care including prognosis, differential diagnosis, risks and benefits of treatment, instructions, compliance risk reductions as well as non-face-to-face time personally spent reviewing medial record, medical documentation, and coordination of care.     EST MINUTES X   [id number] 10-19    [id number] 20-29 X   [id number] 30-39    14201 40-54    NEW     60026 15-29    [id number] 30-44    63367 45-59    [id number] 60-74    PHONE      5-10    [id number] 11-20    [id number] 21-30

## 2022-09-20 ENCOUNTER — TELEPHONE (OUTPATIENT)
Dept: SPORTS MEDICINE | Facility: CLINIC | Age: 37
End: 2022-09-20
Payer: COMMERCIAL

## 2022-09-20 NOTE — TELEPHONE ENCOUNTER
LVM for the Pt asking for her to call back so that we can discuss details and make that follow up she is requesting. Provided call back number as well as informed her about sending a message in the portal.

## 2022-11-08 NOTE — ASSESSMENT & PLAN NOTE
Chronic   Never really goes away  Still does cupping at home and stretches; prn robaxin and mobic as well as topicals   Open to seeing sports med osteopath   
Noted on labs     Wants to avoid starting medication if can  Will work on diet and exercise and also open to red rice yeast   + fam hx  
PAST SURGICAL HISTORY:  H/O vasectomy 2009    History of surgery retrieval of sperm after vasectomy    S/P cholecystectomy 2004

## 2022-11-29 ENCOUNTER — OFFICE VISIT (OUTPATIENT)
Dept: OPTOMETRY | Facility: CLINIC | Age: 37
End: 2022-11-29
Payer: COMMERCIAL

## 2022-11-29 ENCOUNTER — TELEPHONE (OUTPATIENT)
Dept: OPHTHALMOLOGY | Facility: CLINIC | Age: 37
End: 2022-11-29
Payer: COMMERCIAL

## 2022-11-29 DIAGNOSIS — H10.022 MUCOPURULENT CONJUNCTIVITIS OF LEFT EYE: Primary | ICD-10-CM

## 2022-11-29 PROCEDURE — 99999 PR PBB SHADOW E&M-EST. PATIENT-LVL II: CPT | Mod: PBBFAC,,, | Performed by: OPTOMETRIST

## 2022-11-29 PROCEDURE — 99999 PR PBB SHADOW E&M-EST. PATIENT-LVL II: ICD-10-PCS | Mod: PBBFAC,,, | Performed by: OPTOMETRIST

## 2022-11-29 PROCEDURE — 92002 INTRM OPH EXAM NEW PATIENT: CPT | Mod: S$GLB,,, | Performed by: OPTOMETRIST

## 2022-11-29 PROCEDURE — 92002 PR EYE EXAM, NEW PATIENT,INTERMED: ICD-10-PCS | Mod: S$GLB,,, | Performed by: OPTOMETRIST

## 2022-11-29 RX ORDER — TOBRAMYCIN AND DEXAMETHASONE 3; 1 MG/ML; MG/ML
1 SUSPENSION/ DROPS OPHTHALMIC 3 TIMES DAILY
Qty: 1 EACH | Refills: 0 | Status: SHIPPED | OUTPATIENT
Start: 2022-11-29 | End: 2022-12-06

## 2022-11-29 NOTE — PROGRESS NOTES
HPI    CC: eye problem and pain redness, itching, and swollen    PAOLA: 1 year ago  Which eye: left eye but starting to feel the problem in the right   How long: since yesterday afternoon   Improvement: no  (+)redness  (-)itching  (-)tearing  (-)burning  (+)pain  (-)light sensitivity   (-)changes in vision   (+)discharge, left eye was crusted shut with green mucous this morning and   green discharge throughout the day  (+)CL wear, denies sleeping in CLs  (-)gtt use   Last edited by Astrid Sampson, OD on 11/29/2022  3:23 PM.            Assessment /Plan     For exam results, see Encounter Report.    Mucopurulent conjunctivitis of left eye  -     tobramycin-dexAMETHasone 0.3-0.1% (TOBRADEX) 0.3-0.1 % DrpS; Place 1 drop into the left eye 3 (three) times daily. for 7 days  Dispense: 1 each; Refill: 0      Educated pt on today's findings. Begin Tobradex gtts tid OS x 1 week, then D/C. Pt to D/C CL wear until condition resolves.    RTC PRN or ASAP if symptoms persist or worsen

## 2022-11-29 NOTE — TELEPHONE ENCOUNTER
----- Message from Ellie Villanueva sent at 11/29/2022  8:19 AM CST -----  Regarding: Schedule  Pt called that she has an eye infection left eye .    Pts call back :[phone]

## 2022-12-05 ENCOUNTER — PATIENT MESSAGE (OUTPATIENT)
Dept: FAMILY MEDICINE | Facility: CLINIC | Age: 37
End: 2022-12-05
Payer: COMMERCIAL

## 2022-12-12 ENCOUNTER — OFFICE VISIT (OUTPATIENT)
Dept: FAMILY MEDICINE | Facility: CLINIC | Age: 37
End: 2022-12-12
Payer: COMMERCIAL

## 2022-12-12 VITALS
OXYGEN SATURATION: 98 % | WEIGHT: 156.31 LBS | HEART RATE: 73 BPM | BODY MASS INDEX: 28.76 KG/M2 | TEMPERATURE: 98 F | SYSTOLIC BLOOD PRESSURE: 120 MMHG | DIASTOLIC BLOOD PRESSURE: 76 MMHG | HEIGHT: 62 IN

## 2022-12-12 DIAGNOSIS — T75.3XXS SEA SICKNESS, SEQUELA: ICD-10-CM

## 2022-12-12 DIAGNOSIS — M62.838 MUSCLE SPASMS OF NECK: ICD-10-CM

## 2022-12-12 DIAGNOSIS — Z23 NEED FOR VACCINATION: ICD-10-CM

## 2022-12-12 DIAGNOSIS — Z13.1 SCREENING FOR DIABETES MELLITUS: ICD-10-CM

## 2022-12-12 DIAGNOSIS — F43.0 ACUTE STRESS REACTION: ICD-10-CM

## 2022-12-12 DIAGNOSIS — E78.2 MODERATE MIXED HYPERLIPIDEMIA NOT REQUIRING STATIN THERAPY: Primary | ICD-10-CM

## 2022-12-12 LAB
ALBUMIN: 4.3 G/DL (ref 3.5–5)
ALP SERPL-CCNC: 53 U/L (ref 38–126)
ALT SERPL W P-5'-P-CCNC: 10 U/L (ref 7–56)
ANION GAP SERPL CALC-SCNC: 15.3 MMOL/L (ref 9–18)
AST SERPL-CCNC: 15 U/L (ref 7–40)
BILIRUB SERPL-MCNC: 0.3 MG/DL (ref 0–1.2)
BUN BLD-MCNC: 12 MG/DL (ref 7–21)
BUN/CREAT SERPL: 14 (ref 6–22)
CALC OSMOLALITY: 275 MOSM/KG (ref 275–295)
CALCIUM SERPL-MCNC: 9.3 MG/DL (ref 8.5–10.5)
CHLORIDE SERPL-SCNC: 102 MMOL/L (ref 98–107)
CHOL/HDLC RATIO: 4.87
CHOLEST SERPL-MSCNC: 229 MG/DL (ref 100–180)
CO2 SERPL-SCNC: 25 MMOL/L (ref 21–31)
CREAT SERPL-MCNC: 0.87 MG/DL (ref 0.5–1)
EGFR: 87 ML/MIN
GLUCOSE SERPL-MCNC: 86 MG/DL (ref 70–100)
HDLC SERPL-MCNC: 47 MG/DL (ref 40–75)
LDLC SERPL CALC-MCNC: 165 MG/DL (ref 0–100)
POTASSIUM SERPL-SCNC: 4.3 MMOL/L (ref 3.5–5)
SODIUM BLD-SCNC: 138 MMOL/L (ref 135–145)
TOTAL PROTEIN: 7.5 G/DL (ref 6.3–8.2)
TRIGL SERPL-MCNC: 105 MG/DL (ref 30–150)

## 2022-12-12 PROCEDURE — 90471 FLU VACCINE (QUAD) GREATER THAN OR EQUAL TO 3YO PRESERVATIVE FREE IM: ICD-10-PCS | Mod: S$GLB,,, | Performed by: STUDENT IN AN ORGANIZED HEALTH CARE EDUCATION/TRAINING PROGRAM

## 2022-12-12 PROCEDURE — 99999 PR PBB SHADOW E&M-EST. PATIENT-LVL III: CPT | Mod: PBBFAC,,, | Performed by: STUDENT IN AN ORGANIZED HEALTH CARE EDUCATION/TRAINING PROGRAM

## 2022-12-12 PROCEDURE — 99214 OFFICE O/P EST MOD 30 MIN: CPT | Mod: 25,S$GLB,, | Performed by: STUDENT IN AN ORGANIZED HEALTH CARE EDUCATION/TRAINING PROGRAM

## 2022-12-12 PROCEDURE — 90471 IMMUNIZATION ADMIN: CPT | Mod: S$GLB,,, | Performed by: STUDENT IN AN ORGANIZED HEALTH CARE EDUCATION/TRAINING PROGRAM

## 2022-12-12 PROCEDURE — 90686 IIV4 VACC NO PRSV 0.5 ML IM: CPT | Mod: S$GLB,,, | Performed by: STUDENT IN AN ORGANIZED HEALTH CARE EDUCATION/TRAINING PROGRAM

## 2022-12-12 PROCEDURE — 99999 PR PBB SHADOW E&M-EST. PATIENT-LVL III: ICD-10-PCS | Mod: PBBFAC,,, | Performed by: STUDENT IN AN ORGANIZED HEALTH CARE EDUCATION/TRAINING PROGRAM

## 2022-12-12 PROCEDURE — 99214 PR OFFICE/OUTPT VISIT, EST, LEVL IV, 30-39 MIN: ICD-10-PCS | Mod: 25,S$GLB,, | Performed by: STUDENT IN AN ORGANIZED HEALTH CARE EDUCATION/TRAINING PROGRAM

## 2022-12-12 PROCEDURE — 90686 FLU VACCINE (QUAD) GREATER THAN OR EQUAL TO 3YO PRESERVATIVE FREE IM: ICD-10-PCS | Mod: S$GLB,,, | Performed by: STUDENT IN AN ORGANIZED HEALTH CARE EDUCATION/TRAINING PROGRAM

## 2022-12-12 RX ORDER — SCOLOPAMINE TRANSDERMAL SYSTEM 1 MG/1
1 PATCH, EXTENDED RELEASE TRANSDERMAL
Qty: 10 PATCH | Refills: 0 | Status: SHIPPED | OUTPATIENT
Start: 2022-12-12 | End: 2023-06-12

## 2022-12-12 RX ORDER — LORAZEPAM 0.5 MG/1
0.5 TABLET ORAL EVERY 6 HOURS PRN
Qty: 30 TABLET | Refills: 0 | Status: SHIPPED | OUTPATIENT
Start: 2022-12-12 | End: 2023-06-12

## 2022-12-12 RX ORDER — TIZANIDINE 2 MG/1
2-4 TABLET ORAL EVERY 6 HOURS PRN
Qty: 30 TABLET | Refills: 2 | Status: SHIPPED | OUTPATIENT
Start: 2022-12-12 | End: 2022-12-22

## 2022-12-12 NOTE — PROGRESS NOTES
Subjective:       Patient ID: Shelby Anderson is a 37 y.o. female.    Chief Complaint: Follow-up (Pt here for 6 month f/u/ travel medication for sea sickness)    Patient Active Problem List    Diagnosis Date Noted    Acute stress reaction 12/12/2022     Mother on house arrest sentenced last week; 4 years  Finished trial and all within past 2 weeks  She is upset about this and feels bad for her mother  She has not really processed this   In past had therapist she really liked but stopped seeing and no longer taking new pts  She is open to getting back to therapy   Feels she would benefit from short term anxiolytics       Muscle spasms of neck 06/15/2022     Continues to bother her  Left side neck and shoulder  Tight  Worse since working from home and at desk all day   She recently bought standing desk but has not set up yet   Discussed mag topical; muscle relaxer PRN, stretches, massage and foam rolling           Moderate mixed hyperlipidemia not requiring statin therapy 06/15/2022     Has been working on diet   She takes Red Rice Yeast at times  Does not want medication if does not have to   + fam hx mom she thinks is on meds  Advised Mediterranean diet and recheck labs in 6 months will consider adding lipid lowering medication at that time             Review of Systems   Constitutional:  Negative for fever.   HENT: Negative.     Respiratory:  Negative for cough, shortness of breath and wheezing.    Cardiovascular:  Negative for chest pain and leg swelling.   Gastrointestinal:  Negative for abdominal pain, diarrhea, nausea and vomiting.   Genitourinary: Negative.  Negative for difficulty urinating, dysuria and frequency.   Musculoskeletal:  Positive for arthralgias, myalgias, neck pain and neck stiffness.   Neurological: Negative.  Negative for dizziness, numbness and headaches.   Psychiatric/Behavioral:  Positive for sleep disturbance. Negative for dysphoric mood. The patient is nervous/anxious.        A1C:  Recent Labs   Lab 05/24/22  0000   Hemoglobin A1C 5.1     CBC:  Recent Labs   Lab 02/12/21  0809 05/24/22  0905   WBC 6.52 10.8   RBC 4.52 4.16   Hemoglobin 12.8 12.2   Hematocrit 39.6 37.5   Platelets 257 253   MCV 88 90.1   MCH 28.3 29.3   MCHC 32.3 32.5     CMP:  Recent Labs   Lab 02/12/21  0809 05/24/22  0000 12/12/22  0823   Glucose 95 72 86   Calcium 10.0 9.1 9.3   Albumin 4.2 4.3  --    ALBUMIN  --   --  4.3   Total Protein 7.7 7.3 7.5   Sodium 144 143 138   Potassium 4.3 3.9 4.3   CO2 29 25 25   Chloride 104 108 102   BUN 17 16 12.0   Creatinine 0.98 0.88 0.87   Alkaline Phosphatase 72  --  53   ALT 33 10 10   AST 28 13 15   Total Bilirubin 0.3 0.5 0.3     LIPIDS:  Recent Labs   Lab 02/12/21  0809 05/24/22  0000 12/12/22  0823   TSH 0.607 0.57  --    HDL 46 51 47   Cholesterol 186 208 H 229 H   Triglycerides 64 67 105   LDL Calculated  --   --  165 H   LDL Cholesterol 127.2 141 H  --    HDL/Cholesterol Ratio 24.7 4.1  --    Non-HDL Cholesterol 140  --   --    Non HDL Chol. (LDL+VLDL)  --  157 H  --    Total Cholesterol/HDL Ratio 4.0  --   --      TSH:  Recent Labs   Lab 02/12/21  0809 05/24/22  0000   TSH 0.607 0.57        Objective:      Vitals:    12/12/22 0906   BP: 120/76   Pulse: 73   Temp: 98.1 °F (36.7 °C)      Physical Exam  Vitals reviewed.   Constitutional:       Appearance: Normal appearance. She is normal weight.   HENT:      Head: Normocephalic and atraumatic.   Eyes:      Conjunctiva/sclera: Conjunctivae normal.   Cardiovascular:      Rate and Rhythm: Normal rate and regular rhythm.      Heart sounds: Normal heart sounds.   Pulmonary:      Effort: Pulmonary effort is normal.      Breath sounds: Normal breath sounds.   Abdominal:      Palpations: Abdomen is soft.      Tenderness: There is no abdominal tenderness.   Musculoskeletal:         General: Tenderness present. Normal range of motion.      Left shoulder: Tenderness present.      Cervical back: Normal range of motion. Spasms  present.        Back:       Right lower leg: No edema.      Left lower leg: No edema.   Neurological:      General: No focal deficit present.      Mental Status: She is alert and oriented to person, place, and time.   Psychiatric:         Mood and Affect: Mood is anxious.         Behavior: Behavior normal.        Assessment:       1. Moderate mixed hyperlipidemia not requiring statin therapy    2. Acute stress reaction    3. Muscle spasms of neck    4. Sea sickness, sequela    5. Screening for diabetes mellitus    6. Need for vaccination          Plan:   1. Moderate mixed hyperlipidemia not requiring statin therapy  - CBC Without Differential; Standing  - Comprehensive Metabolic Panel; Standing  - Lipid Panel; Standing  - Hemoglobin A1C; Standing    2. Acute stress reaction  - LORazepam (ATIVAN) 0.5 MG tablet; Take 1 tablet (0.5 mg total) by mouth every 6 (six) hours as needed for Anxiety.  Dispense: 30 tablet; Refill: 0    3. Muscle spasms of neck  - tiZANidine (ZANAFLEX) 2 MG tablet; Take 1-2 tablets (2-4 mg total) by mouth every 6 (six) hours as needed (muscle spasms).  Dispense: 30 tablet; Refill: 2    4. Sea sickness, sequela  - scopolamine (TRANSDERM-SCOP) 1.3-1.5 mg (1 mg over 3 days); Place 1 patch onto the skin every 72 hours.  Dispense: 10 patch; Refill: 0    5. Screening for diabetes mellitus  - Hemoglobin A1C; Standing    6. Need for vaccination  - Influenza - Quadrivalent *Preferred* (6 months+) (PF)         Labs per orders  HM: flu shot today   Continue healthy lifestyle efforts  Start tizanidine PRN for muscle spasms   Ativan PRN 30 pills discussed for short term use only and should last 3 months   Scopolamine patches for upcoming cruise   Advised to start red rice yeast daily for cholesterol; Mediterranean diet   Continue current meds as prescribed  Keep routine specialist f/u   RTC in 6 months with labs prior         Sarah A. Champagne [facility]   12/12/22

## 2023-01-18 ENCOUNTER — PATIENT MESSAGE (OUTPATIENT)
Dept: FAMILY MEDICINE | Facility: CLINIC | Age: 38
End: 2023-01-18
Payer: COMMERCIAL

## 2023-01-19 ENCOUNTER — PATIENT MESSAGE (OUTPATIENT)
Dept: ADMINISTRATIVE | Facility: HOSPITAL | Age: 38
End: 2023-01-19
Payer: COMMERCIAL

## 2023-02-13 ENCOUNTER — PATIENT MESSAGE (OUTPATIENT)
Dept: ADMINISTRATIVE | Facility: OTHER | Age: 38
End: 2023-02-13
Payer: COMMERCIAL

## 2023-02-23 ENCOUNTER — HOSPITAL ENCOUNTER (EMERGENCY)
Facility: HOSPITAL | Age: 38
Discharge: HOME | End: 2023-02-23
Attending: EMERGENCY MEDICINE
Payer: COMMERCIAL

## 2023-02-23 VITALS
OXYGEN SATURATION: 99 % | SYSTOLIC BLOOD PRESSURE: 122 MMHG | RESPIRATION RATE: 17 BRPM | DIASTOLIC BLOOD PRESSURE: 74 MMHG | TEMPERATURE: 99 F | HEART RATE: 87 BPM | BODY MASS INDEX: 27.6 KG/M2 | HEIGHT: 62 IN | WEIGHT: 150 LBS

## 2023-02-23 DIAGNOSIS — S63.502A SPRAIN OF LEFT WRIST, INITIAL ENCOUNTER: Primary | ICD-10-CM

## 2023-02-23 DIAGNOSIS — M25.532 LEFT WRIST PAIN: ICD-10-CM

## 2023-02-23 PROCEDURE — 29125 APPL SHORT ARM SPLINT STATIC: CPT | Mod: LT,ER

## 2023-02-23 PROCEDURE — 99283 EMERGENCY DEPT VISIT LOW MDM: CPT | Mod: 25,ER

## 2023-02-23 NOTE — Clinical Note
"Shelby"Shelby" Monica was seen and treated in our emergency department on 2/23/2023.  She may return to work on 02/24/2023.       If you have any questions or concerns, please don't hesitate to call.      Teresa Swan PA-C"

## 2023-02-24 ENCOUNTER — TELEPHONE (OUTPATIENT)
Dept: ORTHOPEDICS | Facility: CLINIC | Age: 38
End: 2023-02-24
Payer: COMMERCIAL

## 2023-02-24 NOTE — DISCHARGE INSTRUCTIONS
-Follow up with primary care doctor and return to the ER if you are experiencing any worsening of symptoms    Thank you for letting myself and our team take care for you today! It was nice meeting you, and I hope you feel better very soon. Please come back to [facility] for all of your future medical needs.   Our goal in the ER is to always give you outstanding care and exceptional service. You may receive a survey by mail or email in the next week about your experience in our ED. We would greatly appreciate you completing and returning the survey. Your feedback provides us with a way to recognize our staff who give very good care and it helps us learn how to improve when your experience was below our aspiration of excellence.     Sincerely,     Teresa Swan PA-C  Emergency Room Physician Assistant  [facility]

## 2023-02-24 NOTE — ED NOTES
Review of patient's allergies indicates:   Allergen Reactions    Guaifenesin         Patient has verified the spelling of the name and  on armband.   APPEARANCE: Patient is alert, calm, oriented x 4, and does not appear distressed.  SKIN: Skin is normal for race, warm, and dry. Normal skin turgor and mucous membranes moist.  CARDIAC: Normal rate and rhythm, no murmur heard.   RESPIRATORY:Normal rate and effort. Breath sounds clear bilaterally throughout chest. Respirations are equal and unlabored.    GASTRO: Bowel sounds normal, abdomen is soft, no tenderness, and no abdominal distention.  MUSCLE: c/o pain that is worse with movement to left anterior wrist area. No obvious deformity.  PERIPHERAL VASCULAR: peripheral pulses present. Normal cap refill. No edema. Warm to touch.  NEURO: 5/5 strength major flexors/extensors bilaterally. Sensory intact to light touch bilaterally. Shelly coma scale: eyes open spontaneously-4, oriented & converses-5, obeys commands-6. No neurological abnormalities.   MENTAL STATUS: awake, alert and aware of environment.  EYE: No overt deficits noted. No drainage. Sclera WNL  ENT: EARS: no obvious drainage. NOSE: no active bleeding. THROAT: no redness or swelling.  : Voids without complication

## 2023-02-24 NOTE — TELEPHONE ENCOUNTER
Called pt to schedule her an appt. No answer. seen she has now been scheduled with MAITE Bustamante.

## 2023-02-24 NOTE — ED PROVIDER NOTES
Encounter Date: 2/23/2023       History     Chief Complaint   Patient presents with    Wrist Pain     Left wrist pain x 2 days, fell at a parade      Patient is a 38-year-old female who presents to the ED with left wrist pain onset 2 days ago.  Patient reports she was holding her child on her shoulders at the parade and fell onto her outstretched left hand.  Patient is right-hand dominant.  She denies any numbness or tingling.  She has it wrapped with Ace wrap.    A ten point review of systems was completed and is negative except as documented above.  Patient denies any other acute medical complaint.    The patients available PMH, PSH, Social History, medications, allergies, and triage vital signs were reviewed just prior to their medical evaluation.      The history is provided by the patient.   Review of patient's allergies indicates:   Allergen Reactions    Guaifenesin      History reviewed. No pertinent past medical history.  History reviewed. No pertinent surgical history.  History reviewed. No pertinent family history.  Social History     Tobacco Use    Smoking status: Never    Smokeless tobacco: Never     Review of Systems   Constitutional:  Negative for fever.   HENT:  Negative for sore throat.    Respiratory:  Negative for shortness of breath.    Cardiovascular:  Negative for chest pain.   Gastrointestinal:  Negative for nausea.   Genitourinary:  Negative for dysuria.   Musculoskeletal:  Negative for back pain.        Left wrist pain   Skin:  Negative for rash.   Neurological:  Negative for weakness.   Hematological:  Does not bruise/bleed easily.     Physical Exam     Initial Vitals [02/23/23 1736]   BP Pulse Resp Temp SpO2   121/74 88 18 99.3 °F (37.4 °C) 98 %      MAP       --         Physical Exam    Nursing note and vitals reviewed.  Constitutional: She appears well-developed and well-nourished. No distress.   HENT:   Head: Normocephalic and atraumatic.   Eyes: EOM are normal. Pupils are equal, round,  and reactive to light. Right eye exhibits no discharge. Left eye exhibits no discharge.   Neck: Neck supple.   Normal range of motion.  Musculoskeletal:         General: Normal range of motion.      Cervical back: Normal range of motion and neck supple.      Comments: Full range of motion of left wrist and hand, mildly tender to palpation to ventrum of wrist, no snuffbox tenderness, sensation intact, radial pulse 2 +, no obvious swelling, no erythema     Neurological: She is alert and oriented to person, place, and time.   Skin: Skin is warm and dry.   Psychiatric: She has a normal mood and affect. Her behavior is normal.       ED Course   Procedures  Labs Reviewed - No data to display       Imaging Results              X-Ray Wrist Complete Left (Final result)  Result time 02/23/23 17:54:01      Final result by Elian Vega MD (02/23/23 17:54:01)                   Impression:      As above      Electronically signed by: Elian Vega  Date:    02/23/2023  Time:    17:54               Narrative:    EXAMINATION:  XR WRIST COMPLETE 3 VIEWS LEFT    CLINICAL HISTORY:  Pain in left wrist    TECHNIQUE:  PA, lateral, and oblique views of the left wrist were performed.    COMPARISON:  None    FINDINGS:  2-3 mm hyperdensity in the radial aspect of the wrist joint along the scaphoid trapezium articulation may relate to avulsion injury of indeterminate age versus foreign body.  Correlate clinically.  No other fracture or dislocation is seen.                                       Medications - No data to display  Medical Decision Making:   Initial Assessment:   Left wrist pain  Differential Diagnosis:   Musculoskeletal pain, muscle strain, ligament tear/sprain, fracture, dislocation     ED Management:  Left wrist pain  -Afebrile, vital signs stable, no apparent distress  -left wrist x-ray resulted 2-3 mm hyperdensity in the radial aspect of the wrist joint along the scaphoid trapezium articulation may relate to avulsion injury  or foreign body, clinicall correlate  -x-ray reviewed with attending physician who recommends compression and follow-up with orthopedist if still experiencing pain  -Patient provided with Velcro wrist brace    Plan:  -Tylenol and ibuprofen, rest, ice, compression  -F/u with orthopedist, referral sent  -Patient is in stable condition to be discharged home. Advised patient to follow up with primary care doctor and return to the ED if experiencing any worsening of symptoms.                          Clinical Impression:   Final diagnoses:  [M25.532] Left wrist pain  [S63.502A] Sprain of left wrist, initial encounter (Primary)        ED Disposition Condition    Discharge Stable          ED Prescriptions    None       Follow-up Information       Follow up With Specialties Details Why Contact Info    Cami Wilkins,  Family Medicine   [id number] [place] [address]  [phone]               eTresa Swan PA-C  02/23/23 7976

## 2023-03-27 ENCOUNTER — OFFICE VISIT (OUTPATIENT)
Dept: DERMATOLOGY | Facility: CLINIC | Age: 38
End: 2023-03-27
Payer: COMMERCIAL

## 2023-03-27 DIAGNOSIS — L92.0 GRANULOMA ANNULARE: Primary | ICD-10-CM

## 2023-03-27 PROCEDURE — 99999 PR PBB SHADOW E&M-EST. PATIENT-LVL II: ICD-10-PCS | Mod: PBBFAC,,,

## 2023-03-27 PROCEDURE — 99214 PR OFFICE/OUTPT VISIT, EST, LEVL IV, 30-39 MIN: ICD-10-PCS | Mod: S$GLB,,, | Performed by: DERMATOLOGY

## 2023-03-27 PROCEDURE — 99999 PR PBB SHADOW E&M-EST. PATIENT-LVL II: CPT | Mod: PBBFAC,,,

## 2023-03-27 PROCEDURE — 99214 OFFICE O/P EST MOD 30 MIN: CPT | Mod: S$GLB,,, | Performed by: DERMATOLOGY

## 2023-03-27 RX ORDER — CLOBETASOL PROPIONATE 0.5 MG/G
CREAM TOPICAL 2 TIMES DAILY
Qty: 30 G | Refills: 3 | Status: SHIPPED | OUTPATIENT
Start: 2023-03-27 | End: 2023-03-27

## 2023-03-27 RX ORDER — CLOBETASOL PROPIONATE 0.5 MG/G
CREAM TOPICAL 2 TIMES DAILY
Qty: 30 G | Refills: 2 | Status: SHIPPED | OUTPATIENT
Start: 2023-03-27 | End: 2023-06-12

## 2023-03-27 RX ORDER — CLOBETASOL PROPIONATE 0.5 MG/G
CREAM TOPICAL 2 TIMES DAILY
Qty: 30 G | Refills: 3 | Status: SHIPPED | OUTPATIENT
Start: 2023-03-27 | End: 2023-03-27 | Stop reason: CLARIF

## 2023-03-27 NOTE — PROGRESS NOTES
Subjective:       Patient ID:  Shelby Anderson is a 38 y.o. female who presents for   Chief Complaint   Patient presents with    Rash     37 yo F no significant past dermatologic history presenting for 2-3 weeks of pruritic eczematous rash on the right dorsal hand. No new medicines, topicals, soaps, or detergent recently. Uses calamine lotion with some relief.    Review of Systems   Constitutional:  Negative for fever and chills.   Skin:  Positive for rash.      Objective:    Physical Exam   Constitutional: She appears well-developed and well-nourished.   Neurological: She is alert and oriented to person, place, and time.   Skin:   Areas Examined (abnormalities noted in diagram):   RUE Inspected           Diagram Legend     Erythematous scaling macule/papule c/w actinic keratosis       Vascular papule c/w angioma      Pigmented verrucoid papule/plaque c/w seborrheic keratosis      Yellow umbilicated papule c/w sebaceous hyperplasia      Irregularly shaped tan macule c/w lentigo     1-2 mm smooth white papules consistent with Milia      Movable subcutaneous cyst with punctum c/w epidermal inclusion cyst      Subcutaneous movable cyst c/w pilar cyst      Firm pink to brown papule c/w dermatofibroma      Pedunculated fleshy papule(s) c/w skin tag(s)      Evenly pigmented macule c/w junctional nevus     Mildly variegated pigmented, slightly irregular-bordered macule c/w mildly atypical nevus      Flesh colored to evenly pigmented papule c/w intradermal nevus       Pink pearly papule/plaque c/w basal cell carcinoma      Erythematous hyperkeratotic cursted plaque c/w SCC      Surgical scar with no sign of skin cancer recurrence      Open and closed comedones      Inflammatory papules and pustules      Verrucoid papule consistent consistent with wart     Erythematous eczematous patches and plaques     Dystrophic onycholytic nail with subungual debris c/w onychomycosis     Umbilicated papule    Erythematous-base  heme-crusted tan verrucoid plaque consistent with inflamed seborrheic keratosis     Erythematous Silvery Scaling Plaque c/w Psoriasis     See annotation          Assessment / Plan:        Granuloma annulare  -     KOH negative today for fungal elements  - clobetasoL (TEMOVATE) 0.05 % cream; Apply topically 2 (two) times daily.  Dispense: 30 g; Refill: 3  - Counseled regarding diagnosis and pamphlet provided  - If no improvement, consider biopsy             RTC PRN or if lesions worsen

## 2023-04-24 LAB
HPV 16: NOT DETECTED
HPV 18/45 RNA: NOT DETECTED
HPV RNA, HR E6/E7, TMA: DETECTED
PAP RECOMMENDATION EXT: NORMAL

## 2023-05-29 ENCOUNTER — PATIENT OUTREACH (OUTPATIENT)
Dept: ADMINISTRATIVE | Facility: HOSPITAL | Age: 38
End: 2023-05-29
Payer: COMMERCIAL

## 2023-05-29 NOTE — PROGRESS NOTES
Care Everywhere updates requested and reviewed.  Immunizations reconciled. Media reports reviewed.  Duplicate HM overrides and  orders removed.  Overdue HM topic chart audit and/or requested.  Overdue lab testing linked to upcoming lab appointments if applies.        Pap HPV IN   Health Maintenance Due   Topic Date Due    COVID-19 Vaccine (4 - Moderna series) 2022

## 2023-06-12 ENCOUNTER — OFFICE VISIT (OUTPATIENT)
Dept: FAMILY MEDICINE | Facility: CLINIC | Age: 38
End: 2023-06-12
Payer: COMMERCIAL

## 2023-06-12 VITALS
WEIGHT: 157.5 LBS | SYSTOLIC BLOOD PRESSURE: 114 MMHG | DIASTOLIC BLOOD PRESSURE: 82 MMHG | TEMPERATURE: 98 F | HEART RATE: 81 BPM | OXYGEN SATURATION: 99 % | BODY MASS INDEX: 28.98 KG/M2 | HEIGHT: 62 IN

## 2023-06-12 DIAGNOSIS — F43.0 ACUTE STRESS REACTION: ICD-10-CM

## 2023-06-12 DIAGNOSIS — E78.2 MODERATE MIXED HYPERLIPIDEMIA NOT REQUIRING STATIN THERAPY: ICD-10-CM

## 2023-06-12 DIAGNOSIS — N94.3 PMS (PREMENSTRUAL SYNDROME): ICD-10-CM

## 2023-06-12 DIAGNOSIS — Z00.00 WELLNESS EXAMINATION: Primary | ICD-10-CM

## 2023-06-12 PROBLEM — M62.838 MUSCLE SPASMS OF NECK: Status: RESOLVED | Noted: 2022-06-15 | Resolved: 2023-06-12

## 2023-06-12 PROCEDURE — 99395 PREV VISIT EST AGE 18-39: CPT | Mod: S$GLB,,, | Performed by: STUDENT IN AN ORGANIZED HEALTH CARE EDUCATION/TRAINING PROGRAM

## 2023-06-12 PROCEDURE — 99395 PR PREVENTIVE VISIT,EST,18-39: ICD-10-PCS | Mod: S$GLB,,, | Performed by: STUDENT IN AN ORGANIZED HEALTH CARE EDUCATION/TRAINING PROGRAM

## 2023-06-12 PROCEDURE — 99999 PR PBB SHADOW E&M-EST. PATIENT-LVL III: ICD-10-PCS | Mod: PBBFAC,,, | Performed by: STUDENT IN AN ORGANIZED HEALTH CARE EDUCATION/TRAINING PROGRAM

## 2023-06-12 PROCEDURE — 99999 PR PBB SHADOW E&M-EST. PATIENT-LVL III: CPT | Mod: PBBFAC,,, | Performed by: STUDENT IN AN ORGANIZED HEALTH CARE EDUCATION/TRAINING PROGRAM

## 2023-06-12 RX ORDER — NORETHINDRONE ACETATE AND ETHINYL ESTRADIOL, ETHINYL ESTRADIOL AND FERROUS FUMARATE 1MG-10(24)
1 KIT ORAL
COMMUNITY
Start: 2023-04-24 | End: 2023-10-24

## 2023-06-12 NOTE — PROGRESS NOTES
Subjective:       Patient ID: Shelby Anderson is a 38 y.o. female.    Chief Complaint: Follow-up (Pt here for a 6 month follow up )      Active Problem List with Overview Notes    Diagnosis Date Noted    PMS (premenstrual syndrome) 06/12/2023     Discussed with OBGYN  Started OCPS in past few months   She has since felt that this is making things worse and rather than a few days of symptoms she is having symptoms all the time   She plans to discuss alternative options       Acute stress reaction 12/12/2022     Stable   Coping [facility]       Moderate mixed hyperlipidemia not requiring statin therapy 06/15/2022     Has made significant diet changes   Starting to exercise as well   She is asymptomatic  LDL down to 126  She is off RYR   She is getting  next year and is preparing now and is working on [employer]            Review of Systems   All other systems reviewed and are negative.     A1C:  Recent Labs   Lab 05/24/22  0000   Hemoglobin A1C 5.1     CBC:  Recent Labs   Lab 02/12/21  0809 05/24/22  0905   WBC 6.52 10.8   RBC 4.52 4.16   Hemoglobin 12.8 12.2   Hematocrit 39.6 37.5   Platelets 257 253   MCV 88 90.1   MCH 28.3 29.3   MCHC 32.3 32.5     CMP:  Recent Labs   Lab 02/12/21  0809 05/24/22  0000 12/12/22  0823   Glucose 95 72 86   Calcium 10.0 9.1 9.3   Albumin 4.2 4.3  --    ALBUMIN  --   --  4.3   Total Protein 7.7 7.3 7.5   Sodium 144 143 138   Potassium 4.3 3.9 4.3   CO2 29 25 25   Chloride 104 108 102   BUN 17 16 12.0   Creatinine 0.98 0.88 0.87   Alkaline Phosphatase 72  --  53   ALT 33 10 10   AST 28 13 15   Total Bilirubin 0.3 0.5 0.3     LIPIDS:  Recent Labs   Lab 02/12/21  0809 05/24/22  0000 12/12/22  0823   TSH 0.607 0.57  --    HDL 46 51 47   Cholesterol 186 208 H 229 H   Triglycerides 64 67 105   LDL Calculated  --   --  165 H   LDL Cholesterol 127.2 141 H  --    HDL/Cholesterol Ratio 24.7 4.1  --    Non-HDL Cholesterol 140  --   --    Non HDL Chol. (LDL+VLDL)  --  157 H  --    Total  Cholesterol/HDL Ratio 4.0  --   --      TSH:  Recent Labs   Lab 02/12/21  0809 05/24/22  0000   TSH 0.607 0.57        Objective:      Vitals:    06/12/23 0830   BP: 114/82   Pulse: 81   Temp: 98.2 °F (36.8 °C)      Physical Exam  Vitals reviewed.   Constitutional:       Appearance: Normal appearance. She is overweight.   HENT:      Head: Normocephalic and atraumatic.   Eyes:      Conjunctiva/sclera: Conjunctivae normal.   Cardiovascular:      Rate and Rhythm: Normal rate and regular rhythm.      Heart sounds: Normal heart sounds.   Pulmonary:      Effort: Pulmonary effort is normal.      Breath sounds: Normal breath sounds.   Abdominal:      Palpations: Abdomen is soft.      Tenderness: There is no abdominal tenderness.   Musculoskeletal:         General: Normal range of motion.      Cervical back: Normal range of motion.      Right lower leg: No edema.      Left lower leg: No edema.   Neurological:      General: No focal deficit present.      Mental Status: She is alert and oriented to person, place, and time.   Psychiatric:         Mood and Affect: Mood normal.         Behavior: Behavior normal.        Assessment:       1. Wellness examination    2. Acute stress reaction    3. Moderate mixed hyperlipidemia not requiring statin therapy    4. PMS (premenstrual syndrome)        Plan:   1. Wellness examination    2. Acute stress reaction    3. Moderate mixed hyperlipidemia not requiring statin therapy    4. PMS (premenstrual syndrome)       Well female  Labs reviewed in detail  HM discussed  UTD  Continue healthy lifestyle efforts  Continue current meds as prescribed otherwise; refills per request  Keep routine specialist f/u   RTC in 1 year with labs prior and/or PRN          Cami Wilkins   [facility]   6/12/23

## 2023-10-24 ENCOUNTER — OFFICE VISIT (OUTPATIENT)
Dept: FAMILY MEDICINE | Facility: CLINIC | Age: 38
End: 2023-10-24
Payer: COMMERCIAL

## 2023-10-24 VITALS
HEART RATE: 76 BPM | WEIGHT: 157.75 LBS | SYSTOLIC BLOOD PRESSURE: 118 MMHG | BODY MASS INDEX: 29.03 KG/M2 | HEIGHT: 62 IN | OXYGEN SATURATION: 100 % | TEMPERATURE: 98 F | DIASTOLIC BLOOD PRESSURE: 84 MMHG

## 2023-10-24 DIAGNOSIS — R07.89 CHEST DISCOMFORT: Primary | ICD-10-CM

## 2023-10-24 PROCEDURE — 93010 ELECTROCARDIOGRAM REPORT: CPT | Mod: S$GLB,,, | Performed by: INTERNAL MEDICINE

## 2023-10-24 PROCEDURE — 93005 EKG 12-LEAD: ICD-10-PCS | Mod: S$GLB,,, | Performed by: PEDIATRICS

## 2023-10-24 PROCEDURE — 99214 OFFICE O/P EST MOD 30 MIN: CPT | Mod: S$GLB,,, | Performed by: PEDIATRICS

## 2023-10-24 PROCEDURE — 99214 PR OFFICE/OUTPT VISIT, EST, LEVL IV, 30-39 MIN: ICD-10-PCS | Mod: S$GLB,,, | Performed by: PEDIATRICS

## 2023-10-24 PROCEDURE — 93010 EKG 12-LEAD: ICD-10-PCS | Mod: S$GLB,,, | Performed by: INTERNAL MEDICINE

## 2023-10-24 PROCEDURE — 93005 ELECTROCARDIOGRAM TRACING: CPT | Mod: S$GLB,,, | Performed by: PEDIATRICS

## 2023-10-24 PROCEDURE — 99999 PR PBB SHADOW E&M-EST. PATIENT-LVL III: CPT | Mod: PBBFAC,,, | Performed by: PEDIATRICS

## 2023-10-24 PROCEDURE — 99999 PR PBB SHADOW E&M-EST. PATIENT-LVL III: ICD-10-PCS | Mod: PBBFAC,,, | Performed by: PEDIATRICS

## 2023-10-24 NOTE — PROGRESS NOTES
Subjective:      Patient ID: Shelby Anderson is a 38 y.o. female.    Chief Complaint: Otalgia (Hearing pulse in both ears, chest pain/discomfort on left side of chest that comes and goes- for 2 weeks. Denies sob )    Patient reports while sitting still, will hear heartbeat in her ears for an hour or more. Admits she feels some chest discomfort intermittently as well, does not feel short of breath, does not feel heavy pressure, just reports a dull achey pain. Reports this is all new since a few weeks ago. It is not worsening but continues to happen a few times weekly. Does not report any more stress than usual. She is engaged and planning a wedding for next year. Has two kids.    Otalgia   Pertinent negatives include no coughing, diarrhea, headaches, rash, rhinorrhea, sore throat or vomiting.     Review of Systems   Constitutional:  Negative for chills, fatigue and fever.   HENT:  Positive for ear pain. Negative for congestion, postnasal drip, rhinorrhea, sinus pressure, sinus pain, sneezing and sore throat.    Respiratory:  Negative for cough, chest tightness, shortness of breath and wheezing.    Cardiovascular:  Negative for chest pain and palpitations.   Gastrointestinal:  Negative for diarrhea, nausea and vomiting.   Genitourinary:  Negative for difficulty urinating.   Musculoskeletal:  Negative for arthralgias.   Skin:  Negative for rash.   Neurological:  Negative for dizziness and headaches.   Psychiatric/Behavioral:  Negative for confusion.         Current Outpatient Medications on File Prior to Visit   Medication Sig    [DISCONTINUED] LO LOESTRIN FE 1 mg-10 mcg (24)/10 mcg (2) Tab Take 1 tablet by mouth.    [DISCONTINUED] methyl salicylate-menthol (ICY HOT) 29-7.6 % Oint     [DISCONTINUED] multivitamin (THERAGRAN) per tablet Take 1 tablet by mouth once daily.    [DISCONTINUED] RED YEAST RICE ORAL Take by mouth daily as needed.     No current facility-administered medications on file prior to visit.         Objective:     Vitals:    10/24/23 0850   BP: 118/84   Pulse: 76   Temp: 98.1 °F (36.7 °C)      Physical Exam  Constitutional:       General: She is not in acute distress.     Appearance: Normal appearance.   HENT:      Head: Normocephalic and atraumatic.      Right Ear: External ear normal.      Left Ear: External ear normal.      Nose: Nose normal.      Mouth/Throat:      Mouth: Mucous membranes are moist.      Pharynx: Oropharynx is clear.   Eyes:      Extraocular Movements: Extraocular movements intact.      Conjunctiva/sclera: Conjunctivae normal.      Pupils: Pupils are equal, round, and reactive to light.   Cardiovascular:      Rate and Rhythm: Normal rate and regular rhythm.      Pulses: Normal pulses.      Heart sounds: Normal heart sounds.   Pulmonary:      Effort: Pulmonary effort is normal. No respiratory distress.      Breath sounds: Normal breath sounds. No wheezing.   Abdominal:      General: Abdomen is flat.   Musculoskeletal:         General: No swelling. Normal range of motion.      Cervical back: Normal range of motion and neck supple.   Skin:     General: Skin is warm and dry.      Findings: No rash.   Neurological:      Mental Status: She is alert and oriented to person, place, and time.      Gait: Gait normal.   Psychiatric:         Mood and Affect: Mood normal.         Behavior: Behavior normal.        Assessment:         1. Chest discomfort          Plan:   1. Chest discomfort  - IN OFFICE EKG 12-LEAD (to Muse)  - Echo; Future  - Ambulatory referral/consult to Cardiology; Future     Discussed Emergency symptoms of chest pain and reason to go to ER.  Discussed symptoms of stress, anxiety, relieved some concern w/ normal EKG  Patient feels comfortable seeing cardiology to establish      Follow up in about 1 year (around 10/24/2024), or if symptoms worsen or fail to improve.       Orlando Ghosh NP   [facility]  10/24/23

## 2023-10-29 ENCOUNTER — PATIENT MESSAGE (OUTPATIENT)
Dept: FAMILY MEDICINE | Facility: CLINIC | Age: 38
End: 2023-10-29
Payer: COMMERCIAL

## 2023-11-30 ENCOUNTER — TELEPHONE (OUTPATIENT)
Dept: ADMINISTRATIVE | Facility: OTHER | Age: 38
End: 2023-11-30
Payer: COMMERCIAL

## 2024-01-31 DIAGNOSIS — Z12.31 OTHER SCREENING MAMMOGRAM: ICD-10-CM

## 2024-05-28 ENCOUNTER — PATIENT MESSAGE (OUTPATIENT)
Dept: FAMILY MEDICINE | Facility: CLINIC | Age: 39
End: 2024-05-28
Payer: COMMERCIAL

## 2024-05-28 DIAGNOSIS — Z87.898 H/O MOTION SICKNESS: Primary | ICD-10-CM

## 2024-05-28 RX ORDER — SCOLOPAMINE TRANSDERMAL SYSTEM 1 MG/1
1 PATCH, EXTENDED RELEASE TRANSDERMAL
Qty: 10 PATCH | Refills: 0 | Status: SHIPPED | OUTPATIENT
Start: 2024-05-28

## 2024-10-11 ENCOUNTER — PATIENT MESSAGE (OUTPATIENT)
Dept: FAMILY MEDICINE | Facility: CLINIC | Age: 39
End: 2024-10-11
Payer: COMMERCIAL

## 2024-10-14 ENCOUNTER — OFFICE VISIT (OUTPATIENT)
Dept: FAMILY MEDICINE | Facility: CLINIC | Age: 39
End: 2024-10-14
Payer: COMMERCIAL

## 2024-10-14 VITALS
TEMPERATURE: 98 F | HEIGHT: 62 IN | SYSTOLIC BLOOD PRESSURE: 104 MMHG | HEART RATE: 79 BPM | WEIGHT: 151.38 LBS | BODY MASS INDEX: 27.86 KG/M2 | OXYGEN SATURATION: 99 % | DIASTOLIC BLOOD PRESSURE: 82 MMHG

## 2024-10-14 DIAGNOSIS — R05.1 ACUTE COUGH: ICD-10-CM

## 2024-10-14 DIAGNOSIS — J06.9 URI, ACUTE: Primary | ICD-10-CM

## 2024-10-14 LAB
CTP QC/QA: YES
SARS-COV-2 RDRP RESP QL NAA+PROBE: NEGATIVE

## 2024-10-14 PROCEDURE — 99999 PR PBB SHADOW E&M-EST. PATIENT-LVL III: CPT | Mod: PBBFAC,,, | Performed by: FAMILY MEDICINE

## 2024-10-14 RX ORDER — LORATADINE 10 MG/1
10 TABLET ORAL DAILY
Qty: 30 TABLET | Refills: 0 | Status: SHIPPED | OUTPATIENT
Start: 2024-10-14 | End: 2024-11-13

## 2024-10-14 RX ORDER — FLUTICASONE PROPIONATE 50 MCG
1 SPRAY, SUSPENSION (ML) NASAL DAILY
Qty: 18 G | Refills: 3 | Status: SHIPPED | OUTPATIENT
Start: 2024-10-14 | End: 2024-11-13

## 2024-10-14 RX ORDER — BENZONATATE 100 MG/1
200 CAPSULE ORAL 3 TIMES DAILY PRN
Qty: 30 CAPSULE | Refills: 1 | Status: SHIPPED | OUTPATIENT
Start: 2024-10-14

## 2024-10-14 NOTE — PROGRESS NOTES
Subjective:       Patient ID: Shelby Anderson is a 39 y.o. female.    Chief Complaint: Sinus Problem (Pt has been having a lot of post nasal drip and coughing for about 1 week now . Neg for fever)    Pt c/o having cough and congestion x one week or more, body ache, no fever  Works from home  Processes medicare appeals works as LPN  Has been taking Robitussin and Alahist        Review of Systems    Objective:      Physical Exam  Vitals and nursing note reviewed.   Constitutional:       General: She is not in acute distress.     Appearance: Normal appearance. She is well-developed. She is not diaphoretic.   HENT:      Head: Normocephalic and atraumatic.      Right Ear: Tympanic membrane normal.      Nose: Nose normal.   Eyes:      General: Lids are normal.      Conjunctiva/sclera: Conjunctivae normal.   Neck:      Trachea: Trachea and phonation normal.   Cardiovascular:      Rate and Rhythm: Normal rate and regular rhythm.      Pulses: Normal pulses.      Heart sounds: Normal heart sounds.   Pulmonary:      Effort: Pulmonary effort is normal.      Breath sounds: Normal breath sounds.   Abdominal:      General: Bowel sounds are normal. There is no abdominal bruit.      Palpations: Abdomen is soft. There is no mass or pulsatile mass.   Musculoskeletal:         General: No deformity.      Cervical back: Full passive range of motion without pain, normal range of motion and neck supple.   Skin:     General: Skin is warm and dry.   Neurological:      Mental Status: She is alert and oriented to person, place, and time.      Sensory: No sensory deficit.      Deep Tendon Reflexes: Reflexes are normal and symmetric.   Psychiatric:         Speech: Speech normal.         Behavior: Behavior normal. Behavior is cooperative.         Thought Content: Thought content normal.         Judgment: Judgment normal.         Assessment:       No diagnosis found.    Plan:       Shelby was seen today for sinus problem.    Diagnoses and all  orders for this visit:    URI, acute    Acute cough    Other orders  -     loratadine (CLARITIN) 10 mg tablet; Take 1 tablet (10 mg total) by mouth once daily.  -     fluticasone propionate (FLONASE) 50 mcg/actuation nasal spray; 1 spray (50 mcg total) by Each Nostril route once daily.

## 2025-02-10 ENCOUNTER — OFFICE VISIT (OUTPATIENT)
Dept: FAMILY MEDICINE | Facility: CLINIC | Age: 40
End: 2025-02-10
Payer: COMMERCIAL

## 2025-02-10 VITALS
OXYGEN SATURATION: 99 % | WEIGHT: 154.19 LBS | DIASTOLIC BLOOD PRESSURE: 76 MMHG | HEART RATE: 73 BPM | SYSTOLIC BLOOD PRESSURE: 122 MMHG | TEMPERATURE: 98 F | HEIGHT: 62 IN | BODY MASS INDEX: 28.37 KG/M2

## 2025-02-10 DIAGNOSIS — M25.512 CHRONIC LEFT SHOULDER PAIN: Primary | ICD-10-CM

## 2025-02-10 DIAGNOSIS — Z00.00 WELLNESS EXAMINATION: ICD-10-CM

## 2025-02-10 DIAGNOSIS — E78.2 MODERATE MIXED HYPERLIPIDEMIA NOT REQUIRING STATIN THERAPY: ICD-10-CM

## 2025-02-10 DIAGNOSIS — G89.29 CHRONIC LEFT SHOULDER PAIN: Primary | ICD-10-CM

## 2025-02-10 DIAGNOSIS — N94.3 PMS (PREMENSTRUAL SYNDROME): ICD-10-CM

## 2025-02-10 PROCEDURE — 99999 PR PBB SHADOW E&M-EST. PATIENT-LVL IV: CPT | Mod: PBBFAC,,, | Performed by: STUDENT IN AN ORGANIZED HEALTH CARE EDUCATION/TRAINING PROGRAM

## 2025-02-10 NOTE — PROGRESS NOTES
Subjective:       Patient ID: Shelby Anderson is a 40 y.o. female.    Chief Complaint: Annual Exam      Review of Systems   Musculoskeletal:  Positive for myalgias (continues with left shoudler pain; intested in botox injections).   All other systems reviewed and are negative.       A1C:  Recent Labs   Lab 05/24/22  0000   Hemoglobin A1C 5.1     CBC:  Recent Labs   Lab 05/24/22  0905   WBC 10.8   RBC 4.16   Hemoglobin 12.2   Hematocrit 37.5   Platelets 253   MCV 90.1   MCH 29.3   MCHC 32.5     CMP:  Recent Labs   Lab 05/24/22  0000 12/12/22  0823   Glucose 72 86   Calcium 9.1 9.3   Albumin 4.3  --    ALBUMIN  --  4.3   Total Protein 7.3 7.5   Sodium 143 138   Potassium 3.9 4.3   CO2 25 25   Chloride 108 102   BUN 16 12.0   Creatinine 0.88 0.87   Alkaline Phosphatase  --  53   ALT 10 10   AST 13 15   Total Bilirubin 0.5 0.3     LIPIDS:  Recent Labs   Lab 05/24/22  0000 12/12/22  0823   TSH 0.57  --    HDL 51 47   Cholesterol 208 H 229 H   Triglycerides 67 105   LDL Cholesterol 141 H  --    LDL Calculated  --  165 H   HDL/Cholesterol Ratio 4.1  --    Non HDL Chol. (LDL+VLDL) 157 H  --      TSH:  Recent Labs   Lab 05/24/22  0000   TSH 0.57        Objective:      Vitals:    02/10/25 1409   BP: 122/76   Pulse: 73   Temp: 98.1 °F (36.7 °C)      Physical Exam  Vitals reviewed.   Constitutional:       Appearance: Normal appearance. She is normal weight.   HENT:      Head: Normocephalic and atraumatic.   Eyes:      Conjunctiva/sclera: Conjunctivae normal.   Cardiovascular:      Rate and Rhythm: Normal rate and regular rhythm.      Heart sounds: Normal heart sounds.   Pulmonary:      Effort: Pulmonary effort is normal.      Breath sounds: Normal breath sounds.   Abdominal:      Palpations: Abdomen is soft.      Tenderness: There is no abdominal tenderness.   Musculoskeletal:         General: Normal range of motion.      Cervical back: Normal range of motion.      Right lower leg: No edema.      Left lower leg: No edema.    Neurological:      General: No focal deficit present.      Mental Status: She is alert and oriented to person, place, and time.   Psychiatric:         Mood and Affect: Mood normal.         Behavior: Behavior normal.          Assessment:       1. Chronic left shoulder pain    2. Wellness examination    3. Moderate mixed hyperlipidemia not requiring statin therapy    4. PMS (premenstrual syndrome)        Plan:     Problem List Items Addressed This Visit          Cardiac/Vascular    Moderate mixed hyperlipidemia not requiring statin therapy    Overview     Labs due                 Renal/    PMS (premenstrual syndrome)    Overview     Follows with OBGYN   [facility]           Other Visit Diagnoses       Chronic left shoulder pain    -  Primary    Relevant Orders    Ambulatory referral/consult to Physical Medicine Rehab    Wellness examination        Relevant Orders    Ambulatory referral/consult to Obstetrics / Gynecology    TSH          Well female  Labs per orders   HM discussed  UTD; discussed Q 2 year mammo with monthly self exams   Problem list reviewed in detail   Continue healthy lifestyle efforts  Continue current meds as prescribed otherwise; refills per request  Keep routine specialist f/u   RTC in 1 year with labs prior and/or PRN          Sarah A. Champagne [facility]   2/10/25

## 2025-02-11 ENCOUNTER — TELEPHONE (OUTPATIENT)
Facility: CLINIC | Age: 40
End: 2025-02-11
Payer: COMMERCIAL

## 2025-02-11 NOTE — TELEPHONE ENCOUNTER
Spoke with pt, pt will contact office back to schedule consult appt. Number provided [phone].

## 2025-03-24 ENCOUNTER — PATIENT MESSAGE (OUTPATIENT)
Dept: FAMILY MEDICINE | Facility: CLINIC | Age: 40
End: 2025-03-24
Payer: COMMERCIAL

## 2025-03-24 ENCOUNTER — OFFICE VISIT (OUTPATIENT)
Dept: OBSTETRICS AND GYNECOLOGY | Facility: CLINIC | Age: 40
End: 2025-03-24
Payer: COMMERCIAL

## 2025-03-24 VITALS — SYSTOLIC BLOOD PRESSURE: 122 MMHG | BODY MASS INDEX: 28.35 KG/M2 | DIASTOLIC BLOOD PRESSURE: 81 MMHG | WEIGHT: 155 LBS

## 2025-03-24 DIAGNOSIS — R87.619 ABNORMAL CERVICAL PAPANICOLAOU SMEAR, UNSPECIFIED ABNORMAL PAP FINDING: ICD-10-CM

## 2025-03-24 DIAGNOSIS — Z00.00 WELLNESS EXAMINATION: ICD-10-CM

## 2025-03-24 DIAGNOSIS — N87.0 DYSPLASIA OF CERVIX, LOW GRADE (CIN 1): ICD-10-CM

## 2025-03-24 DIAGNOSIS — E78.2 MODERATE MIXED HYPERLIPIDEMIA NOT REQUIRING STATIN THERAPY: ICD-10-CM

## 2025-03-24 DIAGNOSIS — Z01.419 WELL WOMAN EXAM WITH ROUTINE GYNECOLOGICAL EXAM: Primary | ICD-10-CM

## 2025-03-24 DIAGNOSIS — Z13.1 SCREENING FOR DIABETES MELLITUS: ICD-10-CM

## 2025-03-24 PROCEDURE — 1159F MED LIST DOCD IN RCRD: CPT | Mod: CPTII,S$GLB,, | Performed by: OBSTETRICS & GYNECOLOGY

## 2025-03-24 PROCEDURE — 99999 PR PBB SHADOW E&M-EST. PATIENT-LVL III: CPT | Mod: PBBFAC,,, | Performed by: OBSTETRICS & GYNECOLOGY

## 2025-03-24 PROCEDURE — 3074F SYST BP LT 130 MM HG: CPT | Mod: CPTII,S$GLB,, | Performed by: OBSTETRICS & GYNECOLOGY

## 2025-03-24 PROCEDURE — 3008F BODY MASS INDEX DOCD: CPT | Mod: CPTII,S$GLB,, | Performed by: OBSTETRICS & GYNECOLOGY

## 2025-03-24 PROCEDURE — 1160F RVW MEDS BY RX/DR IN RCRD: CPT | Mod: CPTII,S$GLB,, | Performed by: OBSTETRICS & GYNECOLOGY

## 2025-03-24 PROCEDURE — 99386 PREV VISIT NEW AGE 40-64: CPT | Mod: S$GLB,,, | Performed by: OBSTETRICS & GYNECOLOGY

## 2025-03-24 PROCEDURE — 3079F DIAST BP 80-89 MM HG: CPT | Mod: CPTII,S$GLB,, | Performed by: OBSTETRICS & GYNECOLOGY

## 2025-03-24 PROCEDURE — 88175 CYTOPATH C/V AUTO FLUID REDO: CPT | Mod: TC | Performed by: OBSTETRICS & GYNECOLOGY

## 2025-03-24 RX ORDER — TRANEXAMIC ACID 650 MG/1
1300 TABLET ORAL 3 TIMES DAILY
Qty: 30 TABLET | Refills: 11 | Status: SHIPPED | OUTPATIENT
Start: 2025-03-24

## 2025-03-24 NOTE — PROGRESS NOTES
CC: Annual check-up    SUBJECTIVE:   40 y.o. female   for annual routine Pap and checkup. Patient's last menstrual period was 2025 (exact date)..  She has no unusual complaints and cycles monthly has 2 hravy days but lasts for 7-10days.    Had abnml pap in past and did 2/3 gardasil inj, interested in completing series, had abnml pap in past   Former pt of Darren Vang  History reviewed. No pertinent past medical history.  History reviewed. No pertinent surgical history.  Social History[1]  Family History   Problem Relation Name Age of Onset    Breast cancer Maternal Cousin       OB History    Para Term  AB Living   3 2 2  1 2   SAB IAB Ectopic Multiple Live Births       2      # Outcome Date GA Lbr Sarthak/2nd Weight Sex Type Anes PTL Lv   3 Term 20    F Vag-Spont   BAILEY   2 Term 12    M Vag-Spont   BAILEY   1 AB 2012                 Current Medications[2]  Allergies: Guaifenesin     ROS:  Constitutional: no weight loss, weight gain, fever, fatigue  Eyes:  No vision changes, glasses/contacts  ENT/Mouth: No ulcers, sinus problems, ears ringing, headache  Cardiovascular: No inability to lie flat, chest pain, exercise intolerance, swelling, heart palpitations  Respiratory: No wheezing, coughing blood, shortness of breath, or cough  Gastrointestinal: No diarrhea, bloody stool, nausea/vomiting, constipation, gas, hemorrhoids  Genitourinary: No blood in urine, painful urination, urgency of urination, frequency of urination, incomplete emptying, incontinence, abnormal bleeding, painful periods, heavy periods, vaginal discharge, vaginal odor, painful intercourse, sexual problems, bleeding after intercourse.  Musculoskeletal: No muscle weakness  Skin/Breast: No painful breasts, nipple discharge, masses, rash, ulcers  Neurological: No passing out, seizures, numbness, headache  Endocrine: No diabetes, hypothyroid, hyperthyroid, hot flashes, hair loss, abnormal hair growth, ance  Psychiatric: No  depression, crying  Hematologic: No bruises, bleeding, swollen lymph nodes, anemia.      OBJECTIVE:   The patient appears well, alert, oriented x 3, in no distress.  /81   Wt 70.3 kg (154 lb 15.7 oz)   LMP 03/07/2025 (Exact Date)   BMI 28.35 kg/m²   NECK: no thyromegaly, trachea midline  SKIN: no acne, striae, hirsutism  BREAST EXAM: not examined  ABDOMEN: no hernias, masses, or hepatosplenomegaly  GENITALIA: normal external genitalia, no erythema, no discharge  URETHRA: normal urethra, normal urethral meatus  VAGINA: Normal  CERVIX: no lesions or cervical motion tenderness  UTERUS: normal  ADNEXA: normal adnexa and no mass, fullness, tenderness    Lab Results   Component Value Date    WBC 10.8 05/24/2022    HGB 12.2 05/24/2022    HCT 37.5 05/24/2022    MCV 90.1 05/24/2022     05/24/2022     Component 1 yr ago   Final Diagnosis    Cervix, 11 o'clock, biopsy:  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  See note.     Note:  The biopsy correlates with the most recent cytology report ([id number]).   Electronically signed by Alie Cancino MD on 6/29/2023 at  1:17 PM        ASSESSMENT:   well woman  1. Well woman exam with routine gynecological exam    2. Wellness examination    3. Abnormal cervical Papanicolaou smear, unspecified abnormal pap finding    4. Dysplasia of cervix, low grade (DAMARIS 1)        PLAN:   mammogram  pap smear  return annually or prn  Orders Placed This Encounter    Mammo Digital Screening Bilat w/ Sameer (XPD)    Liquid-Based Pap Smear, Screening    tranexamic acid (LYSTEDA) 650 mg tablet    hpv vaccine,9-kyra (GARDASIL 9, PF,) 0.5 mL Syrg     Trial of lysteda to shorten vitaly       [1]   Social History  Socioeconomic History    Marital status:    Tobacco Use    Smoking status: Never    Smokeless tobacco: Never   Substance and Sexual Activity    Alcohol use: Yes     Comment: socially    Drug use: Never    Sexual  activity: Yes     Partners: Male     Birth control/protection: Partner-Vasectomy     Comment:      Social Drivers of Health     Financial Resource Strain: Low Risk  (10/12/2024)    Overall Financial Resource Strain (CARDIA)     Difficulty of Paying Living Expenses: Not hard at all   Food Insecurity: No Food Insecurity (10/12/2024)    Hunger Vital Sign     Worried About Running Out of Food in the Last Year: Never true     Ran Out of Food in the Last Year: Never true   Transportation Needs: No Transportation Needs (6/26/2023)    [facility]    [facility]     Lack of Transportation (Medical): No     Lack of Transportation (Non-Medical): No   Physical Activity: Sufficiently Active (10/12/2024)    Exercise Vital Sign     Days of Exercise per Week: 2 days     Minutes of Exercise per Session: 120 min   Stress: No Stress Concern Present (10/12/2024)    [place] [employer] - Occupational Stress Questionnaire     Feeling of Stress : Not at all   Housing Stability: Unknown (10/12/2024)    Housing Stability Vital Sign     Unable to Pay for Housing in the Last Year: No   [2]   Current Outpatient Medications   Medication Sig Dispense Refill    hpv vaccine,9-kyra (GARDASIL 9, PF,) 0.5 mL Syrg Inject 0.5 mLs into the muscle once. for 1 dose 0.5 mL 0    tranexamic acid (LYSTEDA) 650 mg tablet Take 2 tablets (1,300 mg total) by mouth 3 (three) times daily. 30 tablet 11     No current facility-administered medications for this visit.

## 2025-03-26 NOTE — TELEPHONE ENCOUNTER
Please see my chart messages.Pt has appt schedule with specilist as well but wanted your opinoin of what to do until appt.

## 2025-03-27 LAB
INSULIN SERPL-ACNC: NORMAL U[IU]/ML
LAB AP BETHESDA CATEGORY: NORMAL
LAB AP CLINICAL FINDINGS: NORMAL
LAB AP CONTRACEPTIVES: NORMAL
LAB AP GYN ADDITIONAL FINDINGS: NORMAL
LAB AP LMP DATE: NORMAL
LAB AP OCHS PAP SPECIMEN ADEQUACY: NORMAL
LAB AP OHS PAP INTERPRETATION: NORMAL
LAB AP PAP DISCLAIMER COMMENTS: NORMAL
LAB AP PAP ESTROGEN REPLACEMENT THERAPY: NORMAL
LAB AP PAP PMP: NORMAL
LAB AP PAP PREVIOUS BX: NORMAL
LAB AP PAP PRIOR TREATMENT: NORMAL
LAB AP PERFORMING LOCATION(S): NORMAL

## 2025-03-31 DIAGNOSIS — S99.912A INJURY OF LEFT ANKLE, INITIAL ENCOUNTER: Primary | ICD-10-CM

## 2025-04-01 ENCOUNTER — OFFICE VISIT (OUTPATIENT)
Dept: ORTHOPEDICS | Facility: CLINIC | Age: 40
End: 2025-04-01
Payer: COMMERCIAL

## 2025-04-01 DIAGNOSIS — S99.912A INJURY OF LEFT ANKLE, INITIAL ENCOUNTER: Primary | ICD-10-CM

## 2025-04-01 PROCEDURE — 99999 PR PBB SHADOW E&M-EST. PATIENT-LVL III: CPT | Mod: PBBFAC,,, | Performed by: SURGERY

## 2025-04-01 PROCEDURE — 3044F HG A1C LEVEL LT 7.0%: CPT | Mod: CPTII,S$GLB,, | Performed by: SURGERY

## 2025-04-01 PROCEDURE — 99204 OFFICE O/P NEW MOD 45 MIN: CPT | Mod: S$GLB,,, | Performed by: SURGERY

## 2025-04-01 PROCEDURE — 1159F MED LIST DOCD IN RCRD: CPT | Mod: CPTII,S$GLB,, | Performed by: SURGERY

## 2025-04-04 NOTE — PROGRESS NOTES
CC: Ankle pain and swelling    Shelby Anderson is a 40 y.o. Female who reports an injury to ankle, and that the pain is severe and not responding to any conservative care.  Reports twisting of the ankle.    Is affecting ADLs.       PAST MEDICAL HISTORY: No past medical history on file.  PAST SURGICAL HISTORY: No past surgical history on file.  FAMILY HISTORY:   Family History   Problem Relation Name Age of Onset    Breast cancer Maternal Cousin       SOCIAL HISTORY: Social History[1]    MEDICATIONS: Current Medications[2]  ALLERGIES:   Review of patient's allergies indicates:   Allergen Reactions    Guaifenesin        VITAL SIGNS: LMP 03/07/2025 (Exact Date)      Review of Systems   Constitution: Negative. Negative for chills, fever and night sweats.   HENT: Negative for congestion and headaches.    Eyes: Negative for blurred vision, left vision loss and right vision loss.   Cardiovascular: Negative for chest pain and syncope.   Respiratory: Negative for cough and shortness of breath.    Endocrine: Negative for polydipsia, polyphagia and polyuria.   Hematologic/Lymphatic: Negative for bleeding problem. Does not bruise/bleed easily.   Skin: Negative for dry skin, itching and rash.   Musculoskeletal: Negative for falls and muscle weakness.   Gastrointestinal: Negative for abdominal pain and bowel incontinence.   Genitourinary: Negative for bladder incontinence and nocturia.   Neurological: Negative for disturbances in coordination, loss of balance and seizures.   Psychiatric/Behavioral: Negative for depression. The patient does not have insomnia.    Allergic/Immunologic: Negative for hives and persistent infections.       PHYSICAL EXAMINATION    General:  The patient is alert and oriented x 3.  Mood is pleasant.  Observation of ears, eyes and nose reveal no gross abnormalities.  No labored breathing observed.    left Foot and Ankle Exam    INSPECTION:      ALIGNMENT:  Gait:    Antalgic   Hindfoot  Normal     Scars:   None    Midfoot: Normal  Swelling:   mild    Forefoot:  Normal  Color:   Normal      Atrophy:  None    Collective Ankle-Hindfoot Alignment    Heel / Toe Walking: No difficulty   Good -plantigrade (PG), well aligned           [Fair-PG, malaligned, asymptomatic]         [Poor-Non-PG,malaligned, has sxs]     TENDERNESS:  lATERAL:    anterior:  Sinus tarsi:  None  Anteromedial joint line:  none  Syndesmosis:  none  Anterolateral joint line:   none  ATFL:   +  Talonavicular:    none   CFL:   +  Anterior tibialis:   none  Anterolateral gutter: none  Extensor tendons:   none  Fibula:   none  Peroneal tendons: none  POSTERIOR:  Peroneal tubercle.  None  Medial/lateral achilles:   none       Medial/lateral achilles insertion: none  MEDIAL:      Deltoid:  none  CALCANEUS:  Malleolus:  none  Retrocalcaneal:   none  PTT:   none  Medial achilles:   none  Navicular:  none  Lateral achilles:   none       Calcaneal tuberosity:   none  FOOT:    Calcaneal cuboid  none MT / MT heads:  none   Navicular   none  Medial cord origin PF:  none  Cuneiforms:   none  Web space:   none  Lisfranc    none  Tarsal tunnel:   none  Base of the fifth metatarsal  none Tinels sign   neg        RANGE OF MOTION:  RIGHT/ LEFT   STRENGTH: (affected)  Ankle DF/PF:  15/45  15/45    Anterior tibialis: 5/5     Eversion/Inversion: 15/25 15/25  Posterior tibialis: 5/5   Midfoot ABD/ADD: 10/10 10/10  Gastroc-soleus: 5/5   First MTP DF/PF: 60/25 60/25  Peroneals:  5/5         EHL:   5/5   (* = pain)     FHL:   5/5         (* = pain)      SPECIAL TESTS:   ANKLE INSTABILITY: (*pain)    Anterior drawer:   Grade 2      (C-W contralateral side)     Inversion:   30°     Eversion  10°            Collective Instability: (Ant-post and varus-valgus)     Stable        PROVOCATIVE TESTING:    Forced DF/ER: No pain at syndesmosis.    Mid-leg squeeze  No pain at syndesmosis    Forced DF:  No pain anterior joint line.      Forced PF:  No pain posterior ankle.      Forced INV:  Pain present laterally    Forced EV:  No pain medial     Mayerss sign: Normal ankle plantar flexion.     Resisted peroneal No subluxation or pain    1st-2nd MT toggle No pain at Lisfranc    MT-T torque  No pain at Lisfranc     NEUROLOGIC TESTING:  All dermatomes foot, ankle and leg have normal sensation light touch  Ankle Reflexes 2+, symmetric   Negative Babinski and No Clonus    VASCULAR:  2+ pulses PT/DT with brisk capillary refill toes.    Other Findings:      XRAYS:  Left Ankle 3 views (AP, lateral,mortise)  were ordered and reviewed.   No evidence of any fracture or dislocation.  The osseous structures appear well mineralized and well aligned. No mortise displacement.    ASSESSMENT:   Ankle Sprain left  Pes planus  Progressive collapsing foot    PLAN:  1. I have discussed the nature of this problem with the patient today.   2. I think she would benefit from physical therapy - specifically ankle proprioception and peroneal strengthening, edema control.   3. We will provide her with a physical therapy prescription. If she fails to have a good response we may consider further imaging studies as indicated.  4. NSAID prn  5. REST treatment       [1]   Social History  Socioeconomic History    Marital status:    Tobacco Use    Smoking status: Never    Smokeless tobacco: Never   Substance and Sexual Activity    Alcohol use: Yes     Comment: socially    Drug use: Never    Sexual activity: Yes     Partners: Male     Birth control/protection: Partner-Vasectomy     Comment:      Social Drivers of Health     Financial Resource Strain: Low Risk  (10/12/2024)    Overall Financial Resource Strain (CARDIA)     Difficulty of Paying Living Expenses: Not hard at all   Food Insecurity: No Food Insecurity (10/12/2024)    Hunger Vital Sign     Worried About Running Out of Food in the Last Year: Never true     Ran Out of Food in the Last Year: Never true   Transportation Needs: No Transportation  Needs (6/26/2023)    Received from [facility]    PRAPARE - Transportation     Lack of Transportation (Medical): No     Lack of Transportation (Non-Medical): No   Physical Activity: Sufficiently Active (10/12/2024)    Exercise Vital Sign     Days of Exercise per Week: 2 days     Minutes of Exercise per Session: 120 min   Stress: No Stress Concern Present (10/12/2024)    [place] [employer] - Occupational Stress Questionnaire     Feeling of Stress : Not at all   Housing Stability: Unknown (10/12/2024)    Housing Stability Vital Sign     Unable to Pay for Housing in the Last Year: No   [2]   Current Outpatient Medications:     tranexamic acid (LYSTEDA) 650 mg tablet, Take 2 tablets (1,300 mg total) by mouth 3 (three) times daily., Disp: 30 tablet, Rfl: 11

## 2025-04-10 ENCOUNTER — RESULTS FOLLOW-UP (OUTPATIENT)
Dept: FAMILY MEDICINE | Facility: CLINIC | Age: 40
End: 2025-04-10
Payer: COMMERCIAL

## 2025-04-10 DIAGNOSIS — E78.2 MODERATE MIXED HYPERLIPIDEMIA NOT REQUIRING STATIN THERAPY: ICD-10-CM

## 2025-04-10 DIAGNOSIS — Z13.6 ENCOUNTER FOR SCREENING FOR CARDIOVASCULAR DISORDERS: Primary | ICD-10-CM

## 2025-04-21 PROBLEM — M25.572 ACUTE LEFT ANKLE PAIN: Status: ACTIVE | Noted: 2025-04-21

## 2025-04-21 PROBLEM — R29.898 DECREASED STRENGTH OF LOWER EXTREMITY: Status: ACTIVE | Noted: 2025-04-21

## 2025-05-05 ENCOUNTER — HOSPITAL ENCOUNTER (OUTPATIENT)
Dept: RADIOLOGY | Facility: HOSPITAL | Age: 40
Discharge: HOME | End: 2025-05-05
Attending: STUDENT IN AN ORGANIZED HEALTH CARE EDUCATION/TRAINING PROGRAM
Payer: COMMERCIAL

## 2025-05-05 DIAGNOSIS — E78.2 MODERATE MIXED HYPERLIPIDEMIA NOT REQUIRING STATIN THERAPY: ICD-10-CM

## 2025-05-05 DIAGNOSIS — Z13.6 ENCOUNTER FOR SCREENING FOR CARDIOVASCULAR DISORDERS: ICD-10-CM

## 2025-05-05 PROCEDURE — 75571 CT HRT W/O DYE W/CA TEST: CPT | Mod: 26,,, | Performed by: STUDENT IN AN ORGANIZED HEALTH CARE EDUCATION/TRAINING PROGRAM

## 2025-05-05 PROCEDURE — 75571 CT HRT W/O DYE W/CA TEST: CPT | Mod: TC

## 2025-05-08 ENCOUNTER — PATIENT MESSAGE (OUTPATIENT)
Dept: RESEARCH | Facility: HOSPITAL | Age: 40
End: 2025-05-08
Payer: COMMERCIAL

## 2025-05-09 ENCOUNTER — RESULTS FOLLOW-UP (OUTPATIENT)
Dept: FAMILY MEDICINE | Facility: CLINIC | Age: 40
End: 2025-05-09

## 2025-06-02 ENCOUNTER — PATIENT MESSAGE (OUTPATIENT)
Dept: ORTHOPEDICS | Facility: CLINIC | Age: 40
End: 2025-06-02
Payer: COMMERCIAL

## 2025-06-27 ENCOUNTER — OFFICE VISIT (OUTPATIENT)
Dept: ORTHOPEDICS | Facility: CLINIC | Age: 40
End: 2025-06-27
Payer: COMMERCIAL

## 2025-06-27 DIAGNOSIS — S99.912A INJURY OF LEFT ANKLE, INITIAL ENCOUNTER: Primary | ICD-10-CM

## 2025-06-27 PROCEDURE — 99999 PR PBB SHADOW E&M-EST. PATIENT-LVL III: CPT | Mod: PBBFAC,,, | Performed by: SURGERY

## 2025-06-27 NOTE — PROGRESS NOTES
CC: Ankle pain and swelling    Shelby Anderson is a 40 y.o. Female who reports an injury to ankle, and that the pain is severe and not responding to any conservative care.  Reports twisting of the ankle.    Is affecting ADLs.     06/27/2025:  Patient presents to clinic today for repeat evaluation of ongoing left ankle instability.  Patient states she has been in physical therapy for 2 months with minimal relief, was doing well at 2 times a week now not doing as well at 1 time a week.    PAST MEDICAL HISTORY: No past medical history on file.  PAST SURGICAL HISTORY: No past surgical history on file.  FAMILY HISTORY:   Family History   Problem Relation Name Age of Onset    Breast cancer Maternal Cousin       SOCIAL HISTORY: [Social History]    [Social History]  Socioeconomic History    Marital status:    Tobacco Use    Smoking status: Never    Smokeless tobacco: Never   Substance and Sexual Activity    Alcohol use: Yes     Comment: socially    Drug use: Never    Sexual activity: Yes     Partners: Male     Birth control/protection: Partner-Vasectomy     Comment:      Social Drivers of Health     Financial Resource Strain: Low Risk  (10/12/2024)    Overall Financial Resource Strain (CARDIA)     Difficulty of Paying Living Expenses: Not hard at all   Food Insecurity: No Food Insecurity (10/12/2024)    Hunger Vital Sign     Worried About Running Out of Food in the Last Year: Never true     Ran Out of Food in the Last Year: Never true   Transportation Needs: No Transportation Needs (6/26/2023)    [facility]    [facility]     Lack of Transportation (Medical): No     Lack of Transportation (Non-Medical): No   Physical Activity: Sufficiently Active (10/12/2024)    Exercise Vital Sign     Days of Exercise per Week: 2 days     Minutes of Exercise per Session: 120 min   Stress: No Stress Concern Present (10/12/2024)    [place] [employer] - Occupational Stress  Questionnaire     Feeling of Stress : Not at all   Housing Stability: Unknown (10/12/2024)    Housing Stability Vital Sign     Unable to Pay for Housing in the Last Year: No       MEDICATIONS: [Current Medications]    [Current Medications]    Current Outpatient Medications:     tranexamic acid (LYSTEDA) 650 mg tablet, Take 2 tablets (1,300 mg total) by mouth 3 (three) times daily., Disp: 30 tablet, Rfl: 11  ALLERGIES:   Review of patient's allergies indicates:   Allergen Reactions    Guaifenesin        VITAL SIGNS: LMP 03/07/2025 (Exact Date)      Review of Systems   Constitution: Negative. Negative for chills, fever and night sweats.   HENT: Negative for congestion and headaches.    Eyes: Negative for blurred vision, left vision loss and right vision loss.   Cardiovascular: Negative for chest pain and syncope.   Respiratory: Negative for cough and shortness of breath.    Endocrine: Negative for polydipsia, polyphagia and polyuria.   Hematologic/Lymphatic: Negative for bleeding problem. Does not bruise/bleed easily.   Skin: Negative for dry skin, itching and rash.   Musculoskeletal: Negative for falls and muscle weakness.   Gastrointestinal: Negative for abdominal pain and bowel incontinence.   Genitourinary: Negative for bladder incontinence and nocturia.   Neurological: Negative for disturbances in coordination, loss of balance and seizures.   Psychiatric/Behavioral: Negative for depression. The patient does not have insomnia.    Allergic/Immunologic: Negative for hives and persistent infections.       PHYSICAL EXAMINATION    General:  The patient is alert and oriented x 3.  Mood is pleasant.  Observation of ears, eyes and nose reveal no gross abnormalities.  No labored breathing observed.    left Foot and Ankle Exam    INSPECTION:      ALIGNMENT:  Gait:    Antalgic   Hindfoot  Normal    Scars:   None    Midfoot: Normal  Swelling:   mild    Forefoot:  Normal  Color:   Normal      Atrophy:  None    Collective  Ankle-Hindfoot Alignment    Heel / Toe Walking: No difficulty   Good -plantigrade (PG), well aligned           [Fair-PG, malaligned, asymptomatic]         [Poor-Non-PG,malaligned, has sxs]     TENDERNESS:  lATERAL:    anterior:  Sinus tarsi:  None  Anteromedial joint line:  none  Syndesmosis:  none  Anterolateral joint line:   none  ATFL:   +  Talonavicular:    none   CFL:   +  Anterior tibialis:   none  Anterolateral gutter: none  Extensor tendons:   none  Fibula:   none  Peroneal tendons: none  POSTERIOR:  Peroneal tubercle.  None  Medial/lateral achilles:   none       Medial/lateral achilles insertion: none  MEDIAL:      Deltoid:  none  CALCANEUS:  Malleolus:  none  Retrocalcaneal:   none  PTT:   none  Medial achilles:   none  Navicular:  none  Lateral achilles:   none       Calcaneal tuberosity:   none  FOOT:    Calcaneal cuboid  none MT / MT heads:  none   Navicular   none  Medial cord origin PF:  none  Cuneiforms:   none  Web space:   none  Lisfranc    none  Tarsal tunnel:   none  Base of the fifth metatarsal  none Tinels sign   neg        RANGE OF MOTION:  RIGHT/ LEFT   STRENGTH: (affected)  Ankle DF/PF:  15/45  15/45    Anterior tibialis: 5/5     Eversion/Inversion: 15/25 15/25  Posterior tibialis: 5/5   Midfoot ABD/ADD: 10/10 10/10  Gastroc-soleus: 5/5   First MTP DF/PF: 60/25 60/25  Peroneals:  5/5         EHL:   5/5   (* = pain)     FHL:   5/5         (* = pain)      SPECIAL TESTS:   ANKLE INSTABILITY: (*pain)    Anterior drawer:   Grade 2      (C-W contralateral side)     Inversion:   30°     Eversion  10°            Collective Instability: (Ant-post and varus-valgus)     Stable        PROVOCATIVE TESTING:    Forced DF/ER: No pain at syndesmosis.    Mid-leg squeeze  No pain at syndesmosis    Forced DF:  No pain anterior joint line.      Forced PF:  No pain posterior ankle.     Forced INV:  Pain present laterally    Forced EV:  No pain medial     Mayerss sign: Normal ankle plantar flexion.      Resisted peroneal No subluxation or pain    1st-2nd MT toggle No pain at Lisfranc    MT-T torque  No pain at Lisfranc     NEUROLOGIC TESTING:  All dermatomes foot, ankle and leg have normal sensation light touch  Ankle Reflexes 2+, symmetric   Negative Babinski and No Clonus    VASCULAR:  2+ pulses PT/DT with brisk capillary refill toes.    Other Findings:      XRAYS:  No new images today.    ASSESSMENT:   Ankle Sprain left  Pes planus  Progressive collapsing foot    PLAN:    Given her continued pain and feeling of instability despite physical therapy for 2 months, we are going to pursue a left ankle MRI.  After the left ankle MRI I suspect we will be able to allow her to go back to physical therapy with a more rigorous physical therapy.    Left ankle MRI

## 2025-07-14 ENCOUNTER — HOSPITAL ENCOUNTER (OUTPATIENT)
Dept: RADIOLOGY | Facility: HOSPITAL | Age: 40
Discharge: HOME | End: 2025-07-14
Attending: SURGERY
Payer: COMMERCIAL

## 2025-07-14 DIAGNOSIS — S99.912A INJURY OF LEFT ANKLE, INITIAL ENCOUNTER: ICD-10-CM

## 2025-07-14 PROCEDURE — 73721 MRI JNT OF LWR EXTRE W/O DYE: CPT | Mod: 26,LT,, | Performed by: RADIOLOGY

## 2025-07-14 PROCEDURE — 73721 MRI JNT OF LWR EXTRE W/O DYE: CPT | Mod: TC,LT

## 2025-07-29 ENCOUNTER — CLINICAL SUPPORT (OUTPATIENT)
Dept: REHABILITATION | Facility: HOSPITAL | Age: 40
End: 2025-07-29
Payer: COMMERCIAL

## 2025-07-29 DIAGNOSIS — R29.898 DECREASED STRENGTH OF LOWER EXTREMITY: ICD-10-CM

## 2025-07-29 DIAGNOSIS — M25.572 ACUTE LEFT ANKLE PAIN: Primary | ICD-10-CM

## 2025-07-29 PROCEDURE — 97140 MANUAL THERAPY 1/> REGIONS: CPT

## 2025-07-29 PROCEDURE — 97112 NEUROMUSCULAR REEDUCATION: CPT

## 2025-07-29 NOTE — PROGRESS NOTES
"  Outpatient Rehab    Physical Therapy Visit    Patient Name: Shelby Anderson  MRN: [id number]  YOB: 1985  Encounter Date: 7/29/2025    Therapy Diagnosis:   Encounter Diagnoses   Name Primary?    Acute left ankle pain Yes    Decreased strength of lower extremity        Physician: Darien Colin MD    Physician Orders: Eval and Treat  Medical Diagnosis: Injury of left ankle, initial encounter    Visit # / Visits Authorized:  14 / 20  Insurance Authorization Period: 4/21/2025 to 12/31/2025  Date of Evaluation: 4/21/2025  Plan of Care Certification: 4/21/2025 to 7/21/25        Time In: 1600   Time Out: 1700  Total Time: 60   Total Billable Time: 60    FOTO:  Intake Score:  %  Survey Score 1:  %  Survey Score 2:  %         Subjective   had practice yesterday, ankle still a little sore..         Objective            Treatment:       Manual therapy:  TC mobilizations DF  Subtalar mobilizations  Cuboid mobilizations     Therapeutic exercise:      Neuromuscular Reeducation:  Assessment   Seated towel crunches 15x10"  Sneaky lunge 3 rounds  SL RDL with green band on toe 3x8 each  Pt education   HEP    NP   SL Heel raise shuttle 2 springs 3x12  Pt education  HEP      Therapeutic Activities: np   Lateral step down 4 inch x10 each  Shuttle SL press 2 springs 3x12 each      Time Entry(in minutes):  Manual Therapy Time Entry: 25  Neuromuscular Re-Education Time Entry: 30    Assessment & Plan   Assessment: Shelby re-assessed with some decreased mobility along mid foot and along cuboid. Manual to subtalar eversion, mid foot, and cuboid. Reinforcement with strengthening exercises and foot stability exercises. Updated HEP and will continue to progress as able.        Patient will continue to benefit from skilled outpatient physical therapy to address the deficits listed in the problem list box on initial evaluation, provide pt/family education and to maximize pt's level of independence in the home and community " environment.     Patient's spiritual, cultural, and educational needs considered and patient agreeable to plan of care and goals.             Plan: continue with PT 1x/week    Goals:     Active         Physical Therapy         Physical Therapy Goal         Start:  04/22/25    Expected End:  07/21/25        GOALS: Short Term Goals:  2-4 weeks  1.Report decreased ankle pain  < / =  2/10  to increase tolerance for ambulation  2. Increase ROM by 5 degrees in order to walk with min to no compensation.  3. Increase strength by 1/3 MMT grade for  ankle  to increase tolerance for ADL and work activities.  4. Pt to tolerate HEP to improve ROM and independence with ADL's     Long Term Goals: 4-12 weeks  1.Report decreased ankle pain  < / =  0/10  to increase tolerance for exercises  2.Patient goal: get back to full PLOF, full dancing, and workout activities.  3.Increase strength to 5/5 for  ankle  to increase tolerance for ADL and work activities.  4. Pt will report at CJ level (20-40% impaired) on Modified FIM score for mobility to demonstrate increase in LE function and mobility in home and community environment.                [place], PT